# Patient Record
Sex: MALE | Race: WHITE | Employment: OTHER | URBAN - METROPOLITAN AREA
[De-identification: names, ages, dates, MRNs, and addresses within clinical notes are randomized per-mention and may not be internally consistent; named-entity substitution may affect disease eponyms.]

---

## 2017-02-13 DIAGNOSIS — E03.4 HYPOTHYROIDISM DUE TO ACQUIRED ATROPHY OF THYROID: Primary | ICD-10-CM

## 2017-02-13 NOTE — TELEPHONE ENCOUNTER
Patient in [place] currently  Historical in chart    Pending Prescriptions:                       Disp   Refills    levothyroxine (SYNTHROID/LEVOTHROID) 75 M*90 tab*2            Sig: Take 1 tablet (75 mcg) by mouth daily         Last Written Prescription Date: historical  Last Quantity: -, # refills: -  Last Office Visit with G, [facility] or [facility] prescribing provider: 10-4-16        TSH   Date Value Ref Range Status   10/04/2016 1.50 0.40 - 4.00 mU/L Final     RT Alessio (R)

## 2017-02-14 RX ORDER — LEVOTHYROXINE SODIUM 75 UG/1
75 TABLET ORAL DAILY
Qty: 90 TABLET | Refills: 3 | Status: SHIPPED | OUTPATIENT
Start: 2017-02-14 | End: 2018-02-07

## 2017-03-08 DIAGNOSIS — E78.5 HYPERLIPIDEMIA, UNSPECIFIED HYPERLIPIDEMIA TYPE: Primary | ICD-10-CM

## 2017-03-08 NOTE — TELEPHONE ENCOUNTER
Pending Prescriptions:                       Disp   Refills    simvastatin (ZOCOR) 10 MG tablet          30 tab*             Sig: Take 1 tablet (10 mg) by mouth At Bedtime          Last Written Prescription Date:    Last Fill Quantity: ,   # refills:   Last Office Visit with [facility], [facility] or [facility] prescribing provider: 10/04/16  Future Office visit:       Routing refill request to provider for review/approval because:  Medication is reported/historical

## 2017-03-10 RX ORDER — SIMVASTATIN 10 MG
10 TABLET ORAL AT BEDTIME
Qty: 90 TABLET | Refills: 3 | Status: SHIPPED | OUTPATIENT
Start: 2017-03-10 | End: 2018-03-08

## 2017-03-10 NOTE — TELEPHONE ENCOUNTER
Routing refill request to provider for review/approval because:  Medication is reported/historical  Nazia Melendez RN

## 2017-05-24 ENCOUNTER — OFFICE VISIT (OUTPATIENT)
Dept: FAMILY MEDICINE | Facility: CLINIC | Age: 77
End: 2017-05-24
Payer: COMMERCIAL

## 2017-05-24 VITALS
SYSTOLIC BLOOD PRESSURE: 112 MMHG | BODY MASS INDEX: 26.48 KG/M2 | HEART RATE: 62 BPM | DIASTOLIC BLOOD PRESSURE: 66 MMHG | TEMPERATURE: 97.6 F | WEIGHT: 168.7 LBS | OXYGEN SATURATION: 99 % | HEIGHT: 67 IN

## 2017-05-24 DIAGNOSIS — E03.4 HYPOTHYROIDISM DUE TO ACQUIRED ATROPHY OF THYROID: ICD-10-CM

## 2017-05-24 DIAGNOSIS — C44.91 SKIN CANCER, BASAL CELL: Primary | ICD-10-CM

## 2017-05-24 DIAGNOSIS — I10 BENIGN ESSENTIAL HYPERTENSION: ICD-10-CM

## 2017-05-24 DIAGNOSIS — E78.5 HYPERLIPIDEMIA LDL GOAL <130: ICD-10-CM

## 2017-05-24 DIAGNOSIS — N40.0 PROSTATISM: ICD-10-CM

## 2017-05-24 DIAGNOSIS — L57.0 AK (ACTINIC KERATOSIS): ICD-10-CM

## 2017-05-24 PROCEDURE — 99213 OFFICE O/P EST LOW 20 MIN: CPT | Mod: 25 | Performed by: INTERNAL MEDICINE

## 2017-05-24 PROCEDURE — 17003 DESTRUCT PREMALG LES 2-14: CPT | Performed by: INTERNAL MEDICINE

## 2017-05-24 PROCEDURE — 17000 DESTRUCT PREMALG LESION: CPT | Performed by: INTERNAL MEDICINE

## 2017-05-24 NOTE — PROGRESS NOTES
SUBJECTIVE:                                                    Nilo Nelson is a 76 year old male who presents to clinic today for the following health issues:    Mr. Nelson has a history of basal cell cancer and presents to the clinic for follow up of lesion of the dome of his head. He reports there is a new lesion forming by the previously excised lesion on his dome as well as a second new lesion just inferior to the lesion removed form his right ear. He recognizes the lesion on his right ear as changing in texture.    Problem list and histories reviewed & adjusted, as indicated.  Additional history: as documented    Current Outpatient Prescriptions   Medication Sig Dispense Refill     simvastatin (ZOCOR) 10 MG tablet Take 1 tablet (10 mg) by mouth At Bedtime 90 tablet 3     levothyroxine (SYNTHROID/LEVOTHROID) 75 MCG tablet Take 1 tablet (75 mcg) by mouth daily 90 tablet 3     tamsulosin (FLOMAX) 0.4 MG 24 hr capsule Take 0.4 mg by mouth daily.       aspirin 81 MG tablet Take 1 tablet by mouth daily.       Psyllium (METAMUCIL PO) Take 2 tsp by mouth daily.       Multiple Vitamins-Minerals (CENTRUM SILVER) per tablet Take 1 tablet by mouth daily.       GLUCOSAMINE CHONDROITIN COMPLX PO Take 1 tablet by mouth daily.       Loratadine (CLARITIN PO) Take  by mouth as needed.       No Known Allergies    Reviewed and updated as needed this visit by clinical staff       Reviewed and updated as needed this visit by Provider         ROS:  MS: He reports significant improvement of right shoulder pain following a series of visits to a physical therapist. He continues to follow at-home exercises.    Constitutional, HEENT, cardiovascular, pulmonary, gi and gu systems are negative, except as otherwise noted.    This document serves as a record of the services and decisions personally performed and made by Perez Carr MD. It was created on his/her behalf by Bonnie Lauren, a trained medical scribe. The creation of  "this document is based the provider's statements to the medical scribe.  Scribe Bonnie Lauren 2:08 PM, May 24, 2017     OBJECTIVE:                                                    /66 (BP Location: Left arm, Patient Position: Chair, Cuff Size: Adult Regular)  Pulse 62  Temp 97.6  F (36.4  C) (Oral)  Ht 1.702 m (5' 7\")  Wt 76.5 kg (168 lb 11.2 oz)  SpO2 99%  BMI 26.42 kg/m2  Body mass index is 26.42 kg/(m^2).  Neck was supple without adenopathy or thyromegaly his carotids were normal without bruits  Skin there is an actinic keratosis on his right ear and 4 areas of actinic keratoses on the posterior aspect of the dome of his scalp not quite in the configuration of a part.   Procedure:  The five noted areas of actinic keratoses were treated with liquid nitrogen Times two for each lesion.  ASSESSMENT/PLAN:                                                    1. Skin cancer, basal cell  The 5 actinic keratoses on the posterior dome and right ear were treated cryogenically in a freeze-thaw-freeze manner. Discussed sun protection.I'm follow-up skincare was reviewed and precautions for signs or symptoms of infection.     2. Benign essential hypertension  Under great control with today's reading     3. Hyperlipidemia LDL goal <130    4. Prostatism  Symptoms are adequately controlled with his current routine.  5. Hypothyroidism due to acquired atrophy of thyroid  No reported symptoms which would indicate hormone imbalance.     Expect areas to resolve over the next month, if scaliness persists past one month, contact me. Otherwise follow up with routine annual physical.    Perez Carr MD  [facility]    The information in this document, created by the medical scribe for me, accurately reflects the services I personally performed and the decisions made by me. I have reviewed and approved this document for accuracy prior to leaving the patient care area.  Perez Carr MD  2:09 PM, 05/24/17   "

## 2017-05-24 NOTE — NURSING NOTE
"Chief Complaint   Patient presents with     Lesion       Initial /66 (BP Location: Left arm, Patient Position: Chair, Cuff Size: Adult Regular)  Pulse 62  Temp 97.6  F (36.4  C) (Oral)  Ht 5' 7\" (1.702 m)  Wt 168 lb 11.2 oz (76.5 kg)  SpO2 99%  BMI 26.42 kg/m2 Estimated body mass index is 26.42 kg/(m^2) as calculated from the following:    Height as of this encounter: 5' 7\" (1.702 m).    Weight as of this encounter: 168 lb 11.2 oz (76.5 kg).  Medication Reconciliation: complete.  Susan Osei CMA    "

## 2017-05-24 NOTE — MR AVS SNAPSHOT
"              After Visit Summary   5/24/2017    Nilo Nelson    MRN: [id number]           Patient Information     Date Of Birth          1940        Visit Information        Provider Department      5/24/2017 2:00 PM Perez Carr MD [facility]        Today's Diagnoses     Skin cancer, basal cell    -  1    Benign essential hypertension        Hyperlipidemia LDL goal <130        Prostatism        Hypothyroidism due to acquired atrophy of thyroid        AK (actinic keratosis)           Follow-ups after your visit        Your next 10 appointments already scheduled     Oct 05, 2017  9:30 AM CDT   PHYSICAL with Perez Carr MD   [facility] ([facility])    [address] [facility] [address] [phone]              Who to contact     If you have questions or need follow up information about today's clinic visit or your schedule please contact [facility] directly at [phone].  Normal or non-critical lab and imaging results will be communicated to you by MyChart, letter or phone within 4 business days after the clinic has received the results. If you do not hear from us within 7 days, please contact the clinic through Kickit Withhart or phone. If you have a critical or abnormal lab result, we will notify you by phone as soon as possible.  Submit refill requests through LeanApps or call your pharmacy and they will forward the refill request to us. Please allow 3 business days for your refill to be completed.          Additional Information About Your Visit        Kickit WithharMark One Information     LeanApps lets you send messages to your doctor, view your test results, renew your prescriptions, schedule appointments and more. To sign up, go to [email or web address] . Click on \"Log in\" on the left side of the screen, which will take you to the Welcome page. Then click on \"Sign up Now\" on the right side of the page.     You will be asked to enter the access code " "listed below, as well as some personal information. Please follow the directions to create your username and password.     Your access code is: [email or web address]  Expires: 2017  5:42 AM     Your access code will  in 90 days. If you need help or a new code, please call your [facility] or [phone].        Care EveryWhere ID     This is your Care EveryWhere ID. This could be used by other organizations to access your [place] medical records  [id number]        Your Vitals Were     Pulse Temperature Height Pulse Oximetry BMI (Body Mass Index)       62 97.6  F (36.4  C) (Oral) 5' 7\" (1.702 m) 99% 26.42 kg/m2        Blood Pressure from Last 3 Encounters:   17 112/66   10/04/16 116/66   10/18/12 100/70    Weight from Last 3 Encounters:   17 168 lb 11.2 oz (76.5 kg)   10/04/16 160 lb (72.6 kg)   10/18/12 155 lb (70.3 kg)              We Performed the Following     DESTRUCT PREMALIGNANT LESION, 2-14     DESTRUCT PREMALIGNANT LESION, 2-14     DESTRUCT PREMALIGNANT LESION, 2-14     DESTRUCT PREMALIGNANT LESION, 2-14     DESTRUCT PREMALIGNANT LESION, FIRST        Primary Care Provider Office Phone # Fax #    Perez Carr [phone]       [facility] [address] [facility] [address]  [facility] [address]        Thank you!     Thank you for choosing [facility]  for your care. Our goal is always to provide you with excellent care. Hearing back from our patients is one way we can continue to improve our services. Please take a few minutes to complete the written survey that you may receive in the mail after your visit with us. Thank you!             Your Updated Medication List - Protect others around you: Learn how to safely use, store and throw away your medicines at www.disposemymeds.org.          This list is accurate as of: 17 11:59 PM.  Always use your most recent med list.                   Brand Name Dispense Instructions for use    aspirin 81 MG tablet      " Take 1 tablet by mouth daily.       CENTRUM SILVER per tablet      Take 1 tablet by mouth daily.       CLARITIN PO      Take  by mouth as needed.       GLUCOSAMINE CHONDROITIN COMPLX PO      Take 1 tablet by mouth daily.       levothyroxine 75 MCG tablet    SYNTHROID/LEVOTHROID    90 tablet    Take 1 tablet (75 mcg) by mouth daily       METAMUCIL PO      Take 2 tsp by mouth daily.       simvastatin 10 MG tablet    ZOCOR    90 tablet    Take 1 tablet (10 mg) by mouth At Bedtime

## 2017-05-30 ENCOUNTER — TELEPHONE (OUTPATIENT)
Dept: NURSING | Facility: CLINIC | Age: 77
End: 2017-05-30

## 2017-05-30 DIAGNOSIS — N40.0 PROSTATISM: Primary | ICD-10-CM

## 2017-05-30 RX ORDER — TAMSULOSIN HYDROCHLORIDE 0.4 MG/1
0.4 CAPSULE ORAL
Qty: 180 CAPSULE | Refills: 3 | Status: SHIPPED | OUTPATIENT
Start: 2017-05-30 | End: 2018-06-04

## 2017-10-05 ENCOUNTER — OFFICE VISIT (OUTPATIENT)
Dept: FAMILY MEDICINE | Facility: CLINIC | Age: 77
End: 2017-10-05
Payer: COMMERCIAL

## 2017-10-05 VITALS
DIASTOLIC BLOOD PRESSURE: 68 MMHG | BODY MASS INDEX: 25.87 KG/M2 | HEART RATE: 95 BPM | SYSTOLIC BLOOD PRESSURE: 108 MMHG | OXYGEN SATURATION: 97 % | WEIGHT: 164.8 LBS | TEMPERATURE: 98 F | HEIGHT: 67 IN

## 2017-10-05 DIAGNOSIS — E55.9 VITAMIN D DEFICIENCY: ICD-10-CM

## 2017-10-05 DIAGNOSIS — Z00.00 ROUTINE GENERAL MEDICAL EXAMINATION AT A HEALTH CARE FACILITY: ICD-10-CM

## 2017-10-05 DIAGNOSIS — E03.4 HYPOTHYROIDISM DUE TO ACQUIRED ATROPHY OF THYROID: ICD-10-CM

## 2017-10-05 DIAGNOSIS — E78.5 HYPERLIPIDEMIA LDL GOAL <130: ICD-10-CM

## 2017-10-05 DIAGNOSIS — N40.0 PROSTATISM: ICD-10-CM

## 2017-10-05 DIAGNOSIS — Z71.89 ADVANCED DIRECTIVES, COUNSELING/DISCUSSION: ICD-10-CM

## 2017-10-05 DIAGNOSIS — I10 BENIGN ESSENTIAL HYPERTENSION: Primary | ICD-10-CM

## 2017-10-05 LAB
ALBUMIN UR-MCNC: NEGATIVE MG/DL
APPEARANCE UR: CLEAR
BACTERIA #/AREA URNS HPF: ABNORMAL /HPF
BILIRUB UR QL STRIP: NEGATIVE
COLOR UR AUTO: YELLOW
ERYTHROCYTE [DISTWIDTH] IN BLOOD BY AUTOMATED COUNT: 12.9 % (ref 10–15)
GLUCOSE UR STRIP-MCNC: NEGATIVE MG/DL
HCT VFR BLD AUTO: 46.5 % (ref 40–53)
HGB BLD-MCNC: 16 G/DL (ref 13.3–17.7)
HGB UR QL STRIP: ABNORMAL
KETONES UR STRIP-MCNC: NEGATIVE MG/DL
LEUKOCYTE ESTERASE UR QL STRIP: NEGATIVE
MCH RBC QN AUTO: 33.2 PG (ref 26.5–33)
MCHC RBC AUTO-ENTMCNC: 34.4 G/DL (ref 31.5–36.5)
MCV RBC AUTO: 97 FL (ref 78–100)
NITRATE UR QL: NEGATIVE
PH UR STRIP: 7 PH (ref 5–7)
PLATELET # BLD AUTO: 268 10E9/L (ref 150–450)
RBC # BLD AUTO: 4.82 10E12/L (ref 4.4–5.9)
RBC #/AREA URNS AUTO: ABNORMAL /HPF
SOURCE: ABNORMAL
SP GR UR STRIP: 1.01 (ref 1–1.03)
UROBILINOGEN UR STRIP-ACNC: 0.2 EU/DL (ref 0.2–1)
WBC # BLD AUTO: 5.2 10E9/L (ref 4–11)
WBC #/AREA URNS AUTO: ABNORMAL /HPF

## 2017-10-05 PROCEDURE — 36415 COLL VENOUS BLD VENIPUNCTURE: CPT | Performed by: INTERNAL MEDICINE

## 2017-10-05 PROCEDURE — 90662 IIV NO PRSV INCREASED AG IM: CPT | Performed by: INTERNAL MEDICINE

## 2017-10-05 PROCEDURE — 82306 VITAMIN D 25 HYDROXY: CPT | Performed by: INTERNAL MEDICINE

## 2017-10-05 PROCEDURE — 85027 COMPLETE CBC AUTOMATED: CPT | Performed by: INTERNAL MEDICINE

## 2017-10-05 PROCEDURE — 80053 COMPREHEN METABOLIC PANEL: CPT | Performed by: INTERNAL MEDICINE

## 2017-10-05 PROCEDURE — 80061 LIPID PANEL: CPT | Performed by: INTERNAL MEDICINE

## 2017-10-05 PROCEDURE — 81001 URINALYSIS AUTO W/SCOPE: CPT | Performed by: INTERNAL MEDICINE

## 2017-10-05 PROCEDURE — 84443 ASSAY THYROID STIM HORMONE: CPT | Performed by: INTERNAL MEDICINE

## 2017-10-05 PROCEDURE — 99215 OFFICE O/P EST HI 40 MIN: CPT | Mod: 25 | Performed by: INTERNAL MEDICINE

## 2017-10-05 PROCEDURE — G0008 ADMIN INFLUENZA VIRUS VAC: HCPCS | Performed by: INTERNAL MEDICINE

## 2017-10-05 NOTE — MR AVS SNAPSHOT
After Visit Summary   10/5/2017    Nilo Nelson    MRN: [id number]           Patient Information     Date Of Birth          1940        Visit Information        Provider Department      10/5/2017 9:30 AM Perez Carr MD [facility]        Today's Diagnoses     Benign essential hypertension    -  1    Hyperlipidemia LDL goal <130        Prostatism        Hypothyroidism due to acquired atrophy of thyroid        Routine general medical examination at a health care facility        Vitamin D deficiency        Advanced directives, counseling/discussion          Care Instructions      Preventive Health Recommendations:       Male Ages 65 and over    Yearly exam:             See your health care provider every year in order to  o   Review health changes.   o   Discuss preventive care.    o   Review your medicines if your doctor has prescribed any.    Talk with your health care provider about whether you should have a test to screen for prostate cancer (PSA).    Every 3 years, have a diabetes test (fasting glucose). If you are at risk for diabetes, you should have this test more often.    Every 5 years, have a cholesterol test. Have this test more often if you are at risk for high cholesterol or heart disease.     Every 10 years, have a colonoscopy. Or, have a yearly FIT test (stool test). These exams will check for colon cancer.    Talk to with your health care provider about screening for Abdominal Aortic Aneurysm if you have a family history of AAA or have a history of smoking.  Shots:     Get a flu shot each year.     Get a tetanus shot every 10 years.     Talk to your doctor about your pneumonia vaccines. There are now two you should receive - Pneumovax (PPSV 23) and Prevnar (PCV 13).    Talk to your doctor about a shingles vaccine.     Talk to your doctor about the hepatitis B vaccine.  Nutrition:     Eat at least 5 servings of fruits and vegetables each day.     Eat whole-grain  bread, whole-wheat pasta and brown rice instead of white grains and rice.     Talk to your doctor about Calcium and Vitamin D.   Lifestyle    Exercise for at least 150 minutes a week (30 minutes a day, 5 days a week). This will help you control your weight and prevent disease.     Limit alcohol to one drink per day.     No smoking.     Wear sunscreen to prevent skin cancer.     See your dentist every six months for an exam and cleaning.     See your eye doctor every 1 to 2 years to screen for conditions such as glaucoma, macular degeneration and cataracts.          Follow-ups after your visit        Your next 10 appointments already scheduled     Oct 10, 2017  2:30 PM CDT   [facility] Visit with Perez Carr MD   [facility]    [address] [address] [phone]           Bring a current list of meds and any records pertaining to this visit. For Physicals, please bring immunization records and any forms needing to be filled out. Please arrive 10 minutes early to complete paperwork.              Who to contact     If you have questions or need follow up information about today's clinic visit or your schedule please contact [facility] directly at [phone].  Normal or non-critical lab and imaging results will be communicated to you by Novadiolhart, letter or phone within 4 business days after the clinic has received the results. If you do not hear from us within 7 days, please contact the clinic through Novadiolhart or phone. If you have a critical or abnormal lab result, we will notify you by phone as soon as possible.  Submit refill requests through Nomadesk or call your pharmacy and they will forward the refill request to us. Please allow 3 business days for your refill to be completed.          Additional Information About Your Visit        Nomadesk Information     Nomadesk lets you send messages to your doctor, view your test results, renew your  "prescriptions, schedule appointments and more. To sign up, go to [email or web address] . Click on \"Log in\" on the left side of the screen, which will take you to the Welcome page. Then click on \"Sign up Now\" on the right side of the page.     You will be asked to enter the access code listed below, as well as some personal information. Please follow the directions to create your username and password.     Your access code is: [email or web address]  Expires: 2018  4:37 AM     Your access code will  in 90 days. If you need help or a new code, please call your [place] clinic or [phone].        Care EveryWhere ID     This is your Care EveryWhere ID. This could be used by other organizations to access your [place] medical records  [id number]        Your Vitals Were     Pulse Temperature Height Pulse Oximetry BMI (Body Mass Index)       95 98  F (36.7  C) (Oral) 5' 7\" (1.702 m) 97% 25.81 kg/m2        Blood Pressure from Last 3 Encounters:   10/05/17 108/68   17 112/66   10/04/16 116/66    Weight from Last 3 Encounters:   10/05/17 164 lb 12.8 oz (74.8 kg)   17 168 lb 11.2 oz (76.5 kg)   10/04/16 160 lb (72.6 kg)              We Performed the Following     ADMIN 1st VACCINE     CBC with platelets     Comprehensive metabolic panel     FLU VACCINE, INCREASED ANTIGEN, PRESV FREE     Lipid panel reflex to direct LDL     TSH with free T4 reflex     UA reflex to Microscopic and Culture     Urine Microscopic     Vitamin D Deficiency        Primary Care Provider Office Phone # Fax #    Perez Carr [phone] [address]  [facility] [address]        Equal Access to Services     HILDA GOLDSTEIN : Murali Hood, waenoc iglesias, qawalterta keri torrez, loni brooks. So [facility] [phone].    ATENCIÓN: Si habla español, tiene a martinez disposición servicios gratuitos de asistencia lingüística. Cesar al [phone].    We comply with " applicable federal civil rights laws and [place] laws. We do not discriminate on the basis of race, color, national origin, age, disability, sex, sexual orientation, or gender identity.            Thank you!     Thank you for choosing [facility]  for your care. Our goal is always to provide you with excellent care. Hearing back from our patients is one way we can continue to improve our services. Please take a few minutes to complete the written survey that you may receive in the mail after your visit with us. Thank you!             Your Updated Medication List - Protect others around you: Learn how to safely use, store and throw away your medicines at www.disposemymeds.org.          This list is accurate as of: 10/5/17 11:59 PM.  Always use your most recent med list.                   Brand Name Dispense Instructions for use Diagnosis    aspirin 81 MG tablet      Take 1 tablet by mouth daily.        CENTRUM SILVER per tablet      Take 1 tablet by mouth daily.        CLARITIN PO      Take  by mouth as needed.        GLUCOSAMINE CHONDROITIN COMPLX PO      Take 1 tablet by mouth daily.        levothyroxine 75 MCG tablet    SYNTHROID/LEVOTHROID    90 tablet    Take 1 tablet (75 mcg) by mouth daily    Hypothyroidism due to acquired atrophy of thyroid       METAMUCIL PO      Take 2 tsp by mouth daily.        simvastatin 10 MG tablet    ZOCOR    90 tablet    Take 1 tablet (10 mg) by mouth At Bedtime    Hyperlipidemia, unspecified hyperlipidemia type       tamsulosin 0.4 MG capsule    FLOMAX    180 capsule    Take 1 capsule (0.4 mg) by mouth 2 times daily    Prostatism       VITAMIN D (CHOLECALCIFEROL) PO      Take 600 tablets by mouth daily

## 2017-10-05 NOTE — PROGRESS NOTES
SUBJECTIVE:   Nilo Nelson is a 77 year old male who presents for Preventive Visit.    Are you in the first 12 months of your Medicare Part B coverage?  No    Healthy Habits:    Do you get at least three servings of calcium containing foods daily (dairy, green leafy vegetables, etc.)? yes    Amount of exercise or daily activities, outside of work: 8 hour (s) always up doing things per pt     Problems taking medications regularly No    Medication side effects: No    Have you had an eye exam in the past two years? yes    Do you see a dentist twice per year? Once a year     Do you have sleep apnea, excessive snoring or daytime drowsiness?no    COGNITIVE SCREEN  1) Repeat 3 items (Banana, Sunrise, Chair)    2) Clock draw: NORMAL  3) 3 item recall: Recalls 3 objects  Results: 3 items recalled: COGNITIVE IMPAIRMENT LESS LIKELY    Mini-CogTM Copyright S Kav[employer]. Licensed by the author for use in [facility] [employer]; reprinted with permission ([email or web address]). All rights reserved.      Patient with history of prostatism presents to the clinic for a preventive health visit. He complains of occasional weakness in his lower back that resolves with no action from him. He states that his symptoms present every two or three days randomly and they are aggravated by certain movements. Discomfort does not radiate into his leg, is not consistent and it doesn't happen frequently. Patient is not doing any back strengthening exercises. He has a history of right shoulder pain and he reports that pain and flexibility has improved with shoulder exercises.  Patient denies headaches, sinus issues, dyspnea, angina, palpitations, heartburn, bowel changes, and hematochezia. His vision is blurry and shows signs of cataracts which is being followed closely. He does not do any regular exercise. Patient experiences nocturia 1x.    Reviewed and updated as needed this visit by clinical staff  Tobacco  Allergies  Meds  Med Hx  Surg Hx  Fam  Hx  Soc Hx      Reviewed and updated as needed this visit by Provider      Social History   Substance Use Topics     Smoking status: Former Smoker     Smokeless tobacco: Never Used      Comment: per encounter 3/9/07     Alcohol use 0.0 oz/week     0 Standard drinks or equivalent per week      Comment: 1 day       The patient does not drink >3 drinks per day nor >7 drinks per week.    Today's PHQ-2 Score:   PHQ-2 ( 1999 Pf[employer]) 10/4/2016   Q1: Little interest or pleasure in doing things 0   Q2: Feeling down, depressed or hopeless 0   PHQ-2 Score 0     Do you feel safe in your environment - Yes    Do you have a Health Care Directive?: Yes: Advance Directive has been received and scanned.    Current providers sharing in care for this patient include: Patient Care Team:  Perez Carr MD as PCP - General (Internal Medicine)      Hearing impairment: No    Ability to successfully perform activities of daily living: Yes, no assistance needed     Fall risk:       Home safety:  none identified    The following health maintenance items are reviewed in Epic and correct as of today:Health Maintenance   Topic Date Due     ADVANCE DIRECTIVE PLANNING Q5 YRS  08/04/1994     INFLUENZA VACCINE (SYSTEM ASSIGNED)  09/01/2017     FALL RISK ASSESSMENT  10/04/2017     LIPID SCREEN Q5 YR MALE (SYSTEM ASSIGNED)  10/04/2021     TETANUS IMMUNIZATION (SYSTEM ASSIGNED)  09/14/2022     PNEUMOCOCCAL  Completed     Current Outpatient Prescriptions   Medication Sig Dispense Refill     VITAMIN D, CHOLECALCIFEROL, PO Take 600 tablets by mouth daily       tamsulosin (FLOMAX) 0.4 MG capsule Take 1 capsule (0.4 mg) by mouth 2 times daily 180 capsule 3     simvastatin (ZOCOR) 10 MG tablet Take 1 tablet (10 mg) by mouth At Bedtime 90 tablet 3     levothyroxine (SYNTHROID/LEVOTHROID) 75 MCG tablet Take 1 tablet (75 mcg) by mouth daily 90 tablet 3     aspirin 81 MG tablet Take 1 tablet by mouth daily.       Psyllium (METAMUCIL PO) Take 2 tsp by mouth  "daily.       Multiple Vitamins-Minerals (CENTRUM SILVER) per tablet Take 1 tablet by mouth daily.       GLUCOSAMINE CHONDROITIN COMPLX PO Take 1 tablet by mouth daily.       Loratadine (CLARITIN PO) Take  by mouth as needed.       No Known Allergies    ROS:  C: NEGATIVE for fever, chills, change in weight  I: NEGATIVE for worrisome rashes, moles or lesions  E: NEGATIVE for vision changes or irritation  E/M: NEGATIVE for ear, mouth and throat problems  R: NEGATIVE for significant cough or SOB  B: NEGATIVE for masses, tenderness or discharge  CV: NEGATIVE for chest pain, palpitations or peripheral edema  GI: NEGATIVE for nausea, abdominal pain, heartburn, or change in bowel habits  : NEGATIVE for frequency, dysuria, or hematuria  M: NEGATIVE for significant arthralgias or myalgia  N: NEGATIVE for weakness, dizziness or paresthesias  E: NEGATIVE for temperature intolerance, skin/hair changes  H: NEGATIVE for bleeding problems  P: NEGATIVE for changes in mood or affect    This document serves as a record of the services and decisions personally performed and made by Perez Carr MD. It was created on his/her behalf by French Dial, a trained medical scribe. The creation of this document is based the provider's statements to the medical scribe.  Scribkatja Dial 9:52 AM, October 5, 2017    OBJECTIVE:   /68 (BP Location: Left arm, Patient Position: Chair, Cuff Size: Adult Regular)  Pulse 95  Temp 98  F (36.7  C) (Oral)  Ht 1.702 m (5' 7\")  Wt 74.8 kg (164 lb 12.8 oz)  SpO2 97%  BMI 25.81 kg/m2 Estimated body mass index is 26.42 kg/(m^2) as calculated from the following:    Height as of 5/24/17: 5' 7\" (1.702 m).    Weight as of 5/24/17: 168 lb 11.2 oz (76.5 kg).  EXAM:   GENERAL: healthy, alert and no distress  EYES: Eyes grossly normal to inspection, PERRL and conjunctivae and sclerae normal  HENT: ear canals and TM's normal, nose and mouth without ulcers or lesions  NECK: no adenopathy, no " asymmetry, masses, or scars and thyroid normal to palpation  RESP: lungs clear to auscultation - no rales, rhonchi or wheezes  CV: regular rate and rhythm, normal S1 S2, no S3 or S4, no murmur, click or rub, no peripheral edema and peripheral pulses strong  ABDOMEN: soft, nontender, no hepatosplenomegaly, no masses and bowel sounds normal   (male): normal male genitalia without lesions or urethral discharge, no hernia, his testes were slightly atrophic, anus was normal, prostate gland was 3+ and smooth  MS: no gross musculoskeletal defects noted, no edema  SKIN: no suspicious lesions or rashes, he has an actinic keratoses of the right superior back  He has two actinic keratoses on his dome  NEURO: Normal strength and tone, mentation intact and speech normal, straight leg raise was negative, deep tendon reflexes were normal  PSYCH: mentation appears normal, affect normal/bright    ASSESSMENT / PLAN:   1. Benign essential hypertension  - UA reflex to Microscopic and Culture  - Comprehensive metabolic panel    2. Hyperlipidemia LDL goal <130  - Lipid panel reflex to direct LDL    3. Prostatism    4. Hypothyroidism due to acquired atrophy of thyroid  - TSH with free T4 reflex    5. Routine general medical examination at a health care facility  - CBC with platelets  - ADMIN 1st VACCINE    6. Vitamin D deficiency  - Vitamin D Deficiency  - FLU VACCINE, INCREASED ANTIGEN, PRESV FREE    7. Advanced directives, counseling/discussion    -Patient will be returning to follow up on his reports and his wife requested we reevaluate his dementia which will be done at that time.    End of Life Planning:  Patient currently has an advanced directive: Yes.  Practitioner is supportive of decision.    COUNSELING:  Reviewed preventive health counseling, as reflected in patient instructions       Regular exercise       Healthy diet/nutrition       Vision screening       Hearing screening       Dental care    Estimated body mass index is  "26.42 kg/(m^2) as calculated from the following:    Height as of 5/24/17: 5' 7\" (1.702 m).    Weight as of 5/24/17: 168 lb 11.2 oz (76.5 kg).     reports that he has quit smoking. He has never used smokeless tobacco.    Appropriate preventive services were discussed with this patient, including applicable screening as appropriate for cardiovascular disease, diabetes, osteopenia/osteoporosis, and glaucoma.  As appropriate for age/gender, discussed screening for colorectal cancer, prostate cancer, breast cancer, and cervical cancer. Checklist reviewing preventive services available has been given to the patient.    Reviewed patients plan of care and provided an AVS. The Basic Care Plan (routine screening as documented in Health Maintenance) for Nilo meets the Care Plan requirement. This Care Plan has been established and reviewed with the Patient.    Counseling Resources:  ATP IV Guidelines  Pooled Cohorts Equation Calculator  Breast Cancer Risk Calculator  FRAX Risk Assessment  ICSI Preventive Guidelines  Dietary Guidelines for Americans, 2010  [employer]'s MyPlate  ASA Prophylaxis  Lung CA Screening    Perez Carr MD  [facility]    The information in this document, created by the medical scribe for me, accurately reflects the services I personally performed and the decisions made by me. I have reviewed and approved this document for accuracy prior to leaving the patient care area.  Perez Carr MD  10:27 AM, 10/05/17    Injectable Influenza Immunization Documentation    1.  Is the person to be vaccinated sick today?   No    2. Does the person to be vaccinated have an allergy to a component   of the vaccine?   No    3. Has the person to be vaccinated ever had a serious reaction   to influenza vaccine in the past?   No    4. Has the person to be vaccinated ever had Guillain-Barré syndrome?   No    Form completed by   Yakelin Shell MA            "

## 2017-10-06 LAB
ALBUMIN SERPL-MCNC: 4 G/DL (ref 3.4–5)
ALP SERPL-CCNC: 72 U/L (ref 40–150)
ALT SERPL W P-5'-P-CCNC: 27 U/L (ref 0–70)
ANION GAP SERPL CALCULATED.3IONS-SCNC: 8 MMOL/L (ref 3–14)
AST SERPL W P-5'-P-CCNC: 23 U/L (ref 0–45)
BILIRUB SERPL-MCNC: 0.8 MG/DL (ref 0.2–1.3)
BUN SERPL-MCNC: 14 MG/DL (ref 7–30)
CALCIUM SERPL-MCNC: 9.2 MG/DL (ref 8.5–10.1)
CHLORIDE SERPL-SCNC: 105 MMOL/L (ref 94–109)
CHOLEST SERPL-MCNC: 192 MG/DL
CO2 SERPL-SCNC: 25 MMOL/L (ref 20–32)
CREAT SERPL-MCNC: 0.92 MG/DL (ref 0.66–1.25)
DEPRECATED CALCIDIOL+CALCIFEROL SERPL-MC: 48 UG/L (ref 20–75)
GFR SERPL CREATININE-BSD FRML MDRD: 79 ML/MIN/1.7M2
GLUCOSE SERPL-MCNC: 90 MG/DL (ref 70–99)
HDLC SERPL-MCNC: 70 MG/DL
LDLC SERPL CALC-MCNC: 96 MG/DL
NONHDLC SERPL-MCNC: 122 MG/DL
POTASSIUM SERPL-SCNC: 4.7 MMOL/L (ref 3.4–5.3)
PROT SERPL-MCNC: 7.4 G/DL (ref 6.8–8.8)
SODIUM SERPL-SCNC: 138 MMOL/L (ref 133–144)
TRIGL SERPL-MCNC: 128 MG/DL
TSH SERPL DL<=0.005 MIU/L-ACNC: 1.47 MU/L (ref 0.4–4)

## 2017-10-10 ENCOUNTER — OFFICE VISIT (OUTPATIENT)
Dept: FAMILY MEDICINE | Facility: CLINIC | Age: 77
End: 2017-10-10
Payer: COMMERCIAL

## 2017-10-10 VITALS
TEMPERATURE: 96.9 F | OXYGEN SATURATION: 98 % | HEIGHT: 67 IN | DIASTOLIC BLOOD PRESSURE: 73 MMHG | HEART RATE: 65 BPM | SYSTOLIC BLOOD PRESSURE: 125 MMHG | BODY MASS INDEX: 26.06 KG/M2 | WEIGHT: 166 LBS

## 2017-10-10 DIAGNOSIS — E78.5 HYPERLIPIDEMIA LDL GOAL <130: ICD-10-CM

## 2017-10-10 DIAGNOSIS — E03.4 HYPOTHYROIDISM DUE TO ACQUIRED ATROPHY OF THYROID: ICD-10-CM

## 2017-10-10 DIAGNOSIS — L57.0 AK (ACTINIC KERATOSIS): ICD-10-CM

## 2017-10-10 DIAGNOSIS — I10 BENIGN ESSENTIAL HYPERTENSION: Primary | ICD-10-CM

## 2017-10-10 DIAGNOSIS — N40.0 PROSTATISM: ICD-10-CM

## 2017-10-10 PROCEDURE — 17000 DESTRUCT PREMALG LESION: CPT | Performed by: INTERNAL MEDICINE

## 2017-10-10 PROCEDURE — 17003 DESTRUCT PREMALG LES 2-14: CPT | Performed by: INTERNAL MEDICINE

## 2017-10-10 PROCEDURE — 99214 OFFICE O/P EST MOD 30 MIN: CPT | Mod: 25 | Performed by: INTERNAL MEDICINE

## 2017-10-10 NOTE — NURSING NOTE
"Chief Complaint   Patient presents with     Derm Problem       Initial /73 (BP Location: Right arm, Patient Position: Sitting, Cuff Size: Adult Small)  Pulse 65  Temp 96.9  F (36.1  C) (Oral)  Ht 5' 7\" (1.702 m)  Wt 166 lb (75.3 kg)  SpO2 98%  BMI 26 kg/m2 Estimated body mass index is 26 kg/(m^2) as calculated from the following:    Height as of this encounter: 5' 7\" (1.702 m).    Weight as of this encounter: 166 lb (75.3 kg).  Medication Reconciliation: complete   Kathie Clarendon- CMA      "

## 2017-10-10 NOTE — MR AVS SNAPSHOT
"              After Visit Summary   10/10/2017    Nilo Nelson    MRN: [id number]           Patient Information     Date Of Birth          1940        Visit Information        Provider Department      10/10/2017 2:30 PM Perez Carr MD [facility]        Today's Diagnoses     Benign essential hypertension    -  1    Hyperlipidemia LDL goal <130        Prostatism        Hypothyroidism due to acquired atrophy of thyroid        AK (actinic keratosis)           Follow-ups after your visit        Who to contact     If you have questions or need follow up information about today's clinic visit or your schedule please contact [facility] directly at [phone].  Normal or non-critical lab and imaging results will be communicated to you by [employer], letter or phone within 4 business days after the clinic has received the results. If you do not hear from us within 7 days, please contact the clinic through [employer] or phone. If you have a critical or abnormal lab result, we will notify you by phone as soon as possible.  Submit refill requests through [employer] or call your pharmacy and they will forward the refill request to us. Please allow 3 business days for your refill to be completed.          Additional Information About Your Visit        MyChart Information     [employer] lets you send messages to your doctor, view your test results, renew your prescriptions, schedule appointments and more. To sign up, go to [email or web address] . Click on \"Log in\" on the left side of the screen, which will take you to the Welcome page. Then click on \"Sign up Now\" on the right side of the page.     You will be asked to enter the access code listed below, as well as some personal information. Please follow the directions to create your username and password.     Your access code is: [email or web address]  Expires: 2018  4:37 AM     Your access code will  in 90 days. If you need help or a new code, please " "call your [facility] or [phone].        Care EveryWhere ID     This is your Care EveryWhere ID. This could be used by other organizations to access your [facility] medical records  [id number]        Your Vitals Were     Pulse Temperature Height Pulse Oximetry BMI (Body Mass Index)       65 96.9  F (36.1  C) (Oral) 5' 7\" (1.702 m) 98% 26 kg/m2        Blood Pressure from Last 3 Encounters:   10/10/17 125/73   10/05/17 108/68   05/24/17 112/66    Weight from Last 3 Encounters:   10/10/17 166 lb (75.3 kg)   10/05/17 164 lb 12.8 oz (74.8 kg)   05/24/17 168 lb 11.2 oz (76.5 kg)              We Performed the Following     DESTRUCT PREMALIGNANT LESION, 2-14     DESTRUCT PREMALIGNANT LESION, FIRST        Primary Care Provider Office Phone # Fax #    Perez Carr [phone] [address] [address]        Equal Access to Services     [facility]: Hadii aad ku hadasho Sograce, waaxda luqadaha, qaybta kaalmatacho torrez, loni garland . So [facility] [phone].    ATENCIÓN: Si habla español, tiene a martinez disposición servicios gratuitos de asistencia lingüística. [facility] [phone].    We comply with applicable federal civil rights laws and [place] laws. We do not discriminate on the basis of race, color, national origin, age, disability, sex, sexual orientation, or gender identity.            Thank you!     Thank you for choosing [facility]  for your care. Our goal is always to provide you with excellent care. Hearing back from our patients is one way we can continue to improve our services. Please take a few minutes to complete the written survey that you may receive in the mail after your visit with us. Thank you!             Your Updated Medication List - Protect others around you: Learn how to safely use, store and throw away your medicines at www.disposemymeds.org.          This list is accurate as of: 10/10/17 11:59 PM.  " Always use your most recent med list.                   Brand Name Dispense Instructions for use Diagnosis    aspirin 81 MG tablet      Take 1 tablet by mouth daily.        CENTRUM SILVER per tablet      Take 1 tablet by mouth daily.        CLARITIN PO      Take  by mouth as needed.        GLUCOSAMINE CHONDROITIN COMPLX PO      Take 1 tablet by mouth daily.        levothyroxine 75 MCG tablet    SYNTHROID/LEVOTHROID    90 tablet    Take 1 tablet (75 mcg) by mouth daily    Hypothyroidism due to acquired atrophy of thyroid       METAMUCIL PO      Take 2 tsp by mouth daily.        simvastatin 10 MG tablet    ZOCOR    90 tablet    Take 1 tablet (10 mg) by mouth At Bedtime    Hyperlipidemia, unspecified hyperlipidemia type       tamsulosin 0.4 MG capsule    FLOMAX    180 capsule    Take 1 capsule (0.4 mg) by mouth 2 times daily    Prostatism       VITAMIN D (CHOLECALCIFEROL) PO      Take 600 tablets by mouth daily

## 2017-10-10 NOTE — PROGRESS NOTES
SUBJECTIVE:   Nilo Nelson is a 77 year old male who presents to clinic today for the following health issues:    Mr. Nelson has a history of basal cell cancer and presents to the clinic for follow up of lesion of the dome of his head. Patient also complains of a pruritic lesion on the right scapula and multiple other lesions on his back.    Patient returns for follow up of lab results from 10/5/2017 and for memory check.    Problem list and histories reviewed & adjusted, as indicated.  Additional history: as documented    Current Outpatient Prescriptions   Medication Sig Dispense Refill     VITAMIN D, CHOLECALCIFEROL, PO Take 600 tablets by mouth daily       tamsulosin (FLOMAX) 0.4 MG capsule Take 1 capsule (0.4 mg) by mouth 2 times daily 180 capsule 3     simvastatin (ZOCOR) 10 MG tablet Take 1 tablet (10 mg) by mouth At Bedtime 90 tablet 3     levothyroxine (SYNTHROID/LEVOTHROID) 75 MCG tablet Take 1 tablet (75 mcg) by mouth daily 90 tablet 3     aspirin 81 MG tablet Take 1 tablet by mouth daily.       Psyllium (METAMUCIL PO) Take 2 tsp by mouth daily.       Multiple Vitamins-Minerals (CENTRUM SILVER) per tablet Take 1 tablet by mouth daily.       GLUCOSAMINE CHONDROITIN COMPLX PO Take 1 tablet by mouth daily.       Loratadine (CLARITIN PO) Take  by mouth as needed.       No Known Allergies    Reviewed and updated as needed this visit by clinical staffTobacco  Allergies  Meds  Soc Hx      Reviewed and updated as needed this visit by Provider       ROS:  Constitutional, HEENT, cardiovascular, pulmonary, gi and gu systems are negative, except as otherwise noted.    This document serves as a record of the services and decisions personally performed and made by Perez Carr MD. It was created on his/her behalf by Bonnie Lauren, a trained medical scribe. The creation of this document is based the provider's statements to the medical scribe.  Andree Lauren 2:54 PM, October 10,  "2017    OBJECTIVE:   /73 (BP Location: Right arm, Patient Position: Sitting, Cuff Size: Adult Small)  Pulse 65  Temp 96.9  F (36.1  C) (Oral)  Ht 1.702 m (5' 7\")  Wt 75.3 kg (166 lb)  SpO2 98%  BMI 26 kg/m2  Body mass index is 26 kg/(m^2).  Neck was supple without adenopathy or thyromegaly his carotids were normal without bruits  Chest clear to auscultation and percussion  Cardiovascular S1 and S2 are physiologic without murmurs or gallops  Abdomen bowel sounds were normal.  There is no palpable mass or organomegaly  Extremities nontender without any edema  Pulses pedal pulses are as described otherwise his pulses are bilaterally symmetrical throughout without bruits  Skin actinic keratoses on his right scapula, right cheek, dome of head and forehead which were treated with LN x2.    Mini mental exam: 28/30    ASSESSMENT/PLAN:   1. Benign essential hypertension    2. Hyperlipidemia LDL goal <130    3. Prostatism    4. Hypothyroidism to acquired atrophy of thyroid    Patient showed no cognitive impairment. 4 lesions were treated with LN.    Except otherwise noted as above, all conditions are stable and medications are tolerated well. Continue related medications unchanged.     Routine follow up at annual physical.     Perez Carr MD  [facility]    The information in this document, created by the medical scribe for me, accurately reflects the services I personally performed and the decisions made by me. I have reviewed and approved this document for accuracy prior to leaving the patient care area.  Perez Carr MD  2:50 PM, 10/10/17       "

## 2018-02-07 DIAGNOSIS — E03.4 HYPOTHYROIDISM DUE TO ACQUIRED ATROPHY OF THYROID: ICD-10-CM

## 2018-02-07 RX ORDER — LEVOTHYROXINE SODIUM 75 UG/1
TABLET ORAL
Qty: 90 TABLET | Refills: 1 | Status: SHIPPED | OUTPATIENT
Start: 2018-02-07 | End: 2018-08-09

## 2018-02-07 NOTE — TELEPHONE ENCOUNTER
"  levothyroxine (SYNTHROID/LEVOTHROID) 75 MCG tablet 90 tablet 3 2/14/2017       Last Written Prescription Date:  02/14/2017  Last Fill Quantity: 90,  # refills: 3   Last Office Visit with G provider:  10/10/2017   Future Office Visit:   Unknown    Requested Prescriptions   Pending Prescriptions Disp Refills     levothyroxine (SYNTHROID/LEVOTHROID) 75 MCG tablet [Pharmacy Med Name: LEVOTHYROXINE 0.075MG (75MCG) TABS] 90 tablet 0     Sig: TAKE 1 TABLET(75 MCG) BY MOUTH DAILY    Thyroid Protocol Passed    2/7/2018  1:33 PM       Passed - Patient is 12 years or older       Passed - Recent or future visit with authorizing provider's specialty    Patient had office visit in the last year or has a visit in the next 30 days with authorizing provider.  See \"Patient Info\" tab in inbasket, or \"Choose Columns\" in Meds & Orders section of the refill encounter.            Passed - Normal TSH on file in past 12 months    Recent Labs   Lab Test  10/05/17   1057   TSH  1.47                "

## 2018-03-08 DIAGNOSIS — E78.5 HYPERLIPIDEMIA, UNSPECIFIED HYPERLIPIDEMIA TYPE: ICD-10-CM

## 2018-03-08 RX ORDER — SIMVASTATIN 10 MG
TABLET ORAL
Qty: 90 TABLET | Refills: 1 | Status: SHIPPED | OUTPATIENT
Start: 2018-03-08 | End: 2018-09-06

## 2018-05-03 ENCOUNTER — OFFICE VISIT (OUTPATIENT)
Dept: FAMILY MEDICINE | Facility: CLINIC | Age: 78
End: 2018-05-03
Payer: COMMERCIAL

## 2018-05-03 VITALS
TEMPERATURE: 97.2 F | HEART RATE: 76 BPM | RESPIRATION RATE: 18 BRPM | DIASTOLIC BLOOD PRESSURE: 61 MMHG | BODY MASS INDEX: 25.74 KG/M2 | SYSTOLIC BLOOD PRESSURE: 118 MMHG | HEIGHT: 67 IN | WEIGHT: 164 LBS | OXYGEN SATURATION: 98 %

## 2018-05-03 DIAGNOSIS — L30.9 DERMATITIS: Primary | ICD-10-CM

## 2018-05-03 DIAGNOSIS — I10 BENIGN ESSENTIAL HYPERTENSION: ICD-10-CM

## 2018-05-03 DIAGNOSIS — E03.4 HYPOTHYROIDISM DUE TO ACQUIRED ATROPHY OF THYROID: ICD-10-CM

## 2018-05-03 DIAGNOSIS — L57.0 AK (ACTINIC KERATOSIS): ICD-10-CM

## 2018-05-03 DIAGNOSIS — E78.5 HYPERLIPIDEMIA LDL GOAL <130: ICD-10-CM

## 2018-05-03 PROCEDURE — 17110 DESTRUCTION B9 LES UP TO 14: CPT | Performed by: INTERNAL MEDICINE

## 2018-05-03 PROCEDURE — 99212 OFFICE O/P EST SF 10 MIN: CPT | Mod: 25 | Performed by: INTERNAL MEDICINE

## 2018-05-03 NOTE — NURSING NOTE
"Chief Complaint   Patient presents with     Derm Problem     Lesion on Dome of head-follow up       Initial /61 (BP Location: Left arm, Patient Position: Chair, Cuff Size: Adult Large)  Pulse 76  Temp 97.2  F (36.2  C) (Oral)  Resp 18  Ht 5' 7\" (1.702 m)  Wt 164 lb (74.4 kg)  SpO2 98%  BMI 25.69 kg/m2 Estimated body mass index is 25.69 kg/(m^2) as calculated from the following:    Height as of this encounter: 5' 7\" (1.702 m).    Weight as of this encounter: 164 lb (74.4 kg).  Medication Reconciliation: complete   Becky Mullins CMA (AAMA)      "

## 2018-05-03 NOTE — MR AVS SNAPSHOT
"              After Visit Summary   5/3/2018    Nilo Nelson    MRN: [id number]           Patient Information     Date Of Birth          1940        Visit Information        Provider Department      5/3/2018 10:00 AM Perez Carr MD [facility]        Today's Diagnoses     Dermatitis    -  1    Benign essential hypertension        Hyperlipidemia LDL goal <130        Hypothyroidism due to acquired atrophy of thyroid        AK (actinic keratosis)           Follow-ups after your visit        Who to contact     If you have questions or need follow up information about today's clinic visit or your schedule please contact [facility] directly at [phone].  Normal or non-critical lab and imaging results will be communicated to you by MyChart, letter or phone within 4 business days after the clinic has received the results. If you do not hear from us within 7 days, please contact the clinic through [employer] or phone. If you have a critical or abnormal lab result, we will notify you by phone as soon as possible.  Submit refill requests through [employer] or call your pharmacy and they will forward the refill request to us. Please allow 3 business days for your refill to be completed.          Additional Information About Your Visit        MyChart Information     [employer] lets you send messages to your doctor, view your test results, renew your prescriptions, schedule appointments and more. To sign up, go to [email or web address] . Click on \"Log in\" on the left side of the screen, which will take you to the Welcome page. Then click on \"Sign up Now\" on the right side of the page.     You will be asked to enter the access code listed below, as well as some personal information. Please follow the directions to create your username and password.     Your access code is: [email or web address]  Expires: 2018  5:44 AM     Your access code will  in 90 days. If you need help or a new code, please " "call your [facility] or [phone].        Care EveryWhere ID     This is your Care EveryWhere ID. This could be used by other organizations to access your [facility] medical records  [id number]        Your Vitals Were     Pulse Temperature Respirations Height Pulse Oximetry BMI (Body Mass Index)    76 97.2  F (36.2  C) (Oral) 18 5' 7\" (1.702 m) 98% 25.69 kg/m2       Blood Pressure from Last 3 Encounters:   05/03/18 118/61   10/10/17 125/73   10/05/17 108/68    Weight from Last 3 Encounters:   05/03/18 164 lb (74.4 kg)   10/10/17 166 lb (75.3 kg)   10/05/17 164 lb 12.8 oz (74.8 kg)              We Performed the Following     DESTRUCT PREMALIGNANT LESION, 2-14     DESTRUCT PREMALIGNANT LESION, FIRST        Primary Care Provider Office Phone # Fax #    Perez Carr [phone] [address] [address]        Equal Access to Services     [facility]: Hadii amos ku hadmendoza Sograce, waaxda lumartina, qaybta kashirley torrez, loni garland . So [facility] [phone].    ATENCIÓN: Si habla español, tiene a martinez disposición servicios gratuitos de asistencia lingüística. [facility] [phone].    We comply with applicable federal civil rights laws and [place] laws. We do not discriminate on the basis of race, color, national origin, age, disability, sex, sexual orientation, or gender identity.            Thank you!     Thank you for choosing [facility]  for your care. Our goal is always to provide you with excellent care. Hearing back from our patients is one way we can continue to improve our services. Please take a few minutes to complete the written survey that you may receive in the mail after your visit with us. Thank you!             Your Updated Medication List - Protect others around you: Learn how to safely use, store and throw away your medicines at www.disposemymeds.org.          This list is accurate as of 5/3/18 11:59 " PM.  Always use your most recent med list.                   Brand Name Dispense Instructions for use Diagnosis    aspirin 81 MG tablet      Take 1 tablet by mouth daily.        CENTRUM SILVER per tablet      Take 1 tablet by mouth daily.        CLARITIN PO      Take  by mouth as needed.        GLUCOSAMINE CHONDROITIN COMPLX PO      Take 1 tablet by mouth daily.        levothyroxine 75 MCG tablet    SYNTHROID/LEVOTHROID    90 tablet    TAKE 1 TABLET(75 MCG) BY MOUTH DAILY    Hypothyroidism due to acquired atrophy of thyroid       METAMUCIL PO      Take 2 tsp by mouth daily.        simvastatin 10 MG tablet    ZOCOR    90 tablet    TAKE 1 TABLET(10 MG) BY MOUTH AT BEDTIME    Hyperlipidemia, unspecified hyperlipidemia type       tamsulosin 0.4 MG capsule    FLOMAX    180 capsule    Take 1 capsule (0.4 mg) by mouth 2 times daily    Prostatism       VITAMIN D (CHOLECALCIFEROL) PO      Take 600 tablets by mouth daily

## 2018-05-03 NOTE — PROGRESS NOTES
SUBJECTIVE:                                                    Nilo Nelson is a 77 year old male who presents to clinic today for the following health issues:    Lesions      Duration: ongoing 6+ months    Description (location/character/radiation): Multiple lesions on top of scalp.     Intensity:  mild    Accompanying signs and symptoms: No itching; No pain; Bleed sometimes; Scab over-fall off-returns; Multiple on top of head and edge of Rt ear    History (similar episodes/previous evaluation): Personal Hx of Basal Cell Carcinoma; No family Hx of other Skin cancers     Precipitating or alleviating factors: None    Therapies tried and outcome: Wearing Hat year round; Sun Screen-daily when in [place]     Patient presents to the clinic to follow up on lesions on his scalp. He has history of basal cell carcinoma. He states that they have persisted for 6 months. He denies any itching or pain from the lesions. He does affirm to occasional bleeding. He wears a hat year round and wears good sunscreen daily when in [place].      Problem list and histories reviewed & adjusted, as indicated.  Additional history: as documented    Current Outpatient Prescriptions   Medication Sig Dispense Refill     aspirin 81 MG tablet Take 1 tablet by mouth daily.       GLUCOSAMINE CHONDROITIN COMPLX PO Take 1 tablet by mouth daily.       levothyroxine (SYNTHROID/LEVOTHROID) 75 MCG tablet TAKE 1 TABLET(75 MCG) BY MOUTH DAILY 90 tablet 1     Loratadine (CLARITIN PO) Take  by mouth as needed.       Multiple Vitamins-Minerals (CENTRUM SILVER) per tablet Take 1 tablet by mouth daily.       Psyllium (METAMUCIL PO) Take 2 tsp by mouth daily.       simvastatin (ZOCOR) 10 MG tablet TAKE 1 TABLET(10 MG) BY MOUTH AT BEDTIME 90 tablet 1     tamsulosin (FLOMAX) 0.4 MG capsule Take 1 capsule (0.4 mg) by mouth 2 times daily 180 capsule 3     VITAMIN D, CHOLECALCIFEROL, PO Take 600 tablets by mouth daily       No Known  "Allergies    ROS:  Constitutional, HEENT, cardiovascular, pulmonary, gi and gu systems are negative, except as otherwise noted.    This document serves as a record of the services and decisions personally performed and made by Perez Carr MD. It was created on his/her behalf by French Dial, a trained medical scribe. The creation of this document is based the provider's statements to the medical scribe.  Scribkatja Dial 10:22 AM, May 3, 2018    OBJECTIVE:     /61 (BP Location: Left arm, Patient Position: Chair, Cuff Size: Adult Large)  Pulse 76  Temp 97.2  F (36.2  C) (Oral)  Resp 18  Ht 1.702 m (5' 7\")  Wt 74.4 kg (164 lb)  SpO2 98%  BMI 25.69 kg/m2  Body mass index is 25.69 kg/(m^2).    HEENT: he had three areas of erythematous, scaly skin consistent with actinic keratoses on the dome of his head and one on his right ear, the rest of his head and neck was normal    Procedure: Three areas of actinic keratoses on the dome of his scalp and one on his right ear had liquid nitrogen applied x3    Diagnostic Test Results:  none     ASSESSMENT/PLAN:     1. Dermatitis  -Treated with liquid nitrogen    2. Benign essential hypertension  -Well controlled with current therapies.    3. Hyperlipidemia LDL goal <130  -Well be reevaluated at next visit.      Perez Carr MD  [facility]    The information in this document, created by the medical scribe for me, accurately reflects the services I personally performed and the decisions made by me. I have reviewed and approved this document for accuracy prior to leaving the patient care area.  Perez Carr MD  10:33 AM, 05/03/18          "

## 2018-06-04 DIAGNOSIS — N40.0 PROSTATISM: ICD-10-CM

## 2018-06-05 NOTE — TELEPHONE ENCOUNTER
"  tamsulosin (FLOMAX) 0.4 MG capsule 180 capsule 3 5/30/2017       Last Written Prescription Date:  05/30/2017  Last Fill Quantity: 180,  # refills: 3   Last office visit: 5/3/2018 with prescribing provider:     Future Office Visit:  Unknown    Requested Prescriptions   Pending Prescriptions Disp Refills     tamsulosin (FLOMAX) 0.4 MG capsule [Pharmacy Med Name: TAMSULOSIN 0.4MG CAPSULES] 180 capsule 0     Sig: TAKE 1 CAPSULE(0.4 MG) BY MOUTH TWICE DAILY    Alpha Blockers Passed    6/4/2018  4:12 PM       Passed - Blood pressure under 140/90 in past 12 months    BP Readings from Last 3 Encounters:   05/03/18 118/61   10/10/17 125/73   10/05/17 108/68                Passed - Recent (12 mo) or future (30 days) visit within the authorizing provider's specialty    Patient had office visit in the last 12 months or has a visit in the next 30 days with authorizing provider or within the authorizing provider's specialty.  See \"Patient Info\" tab in inbasket, or \"Choose Columns\" in Meds & Orders section of the refill encounter.           Passed - Patient does not have Tadalafil, Vardenafil, or Sildenafil on their medication list       Passed - Patient is 18 years of age or older          "

## 2018-06-06 RX ORDER — TAMSULOSIN HYDROCHLORIDE 0.4 MG/1
CAPSULE ORAL
Qty: 180 CAPSULE | Refills: 3 | Status: SHIPPED | OUTPATIENT
Start: 2018-06-06 | End: 2019-06-07

## 2018-08-01 ENCOUNTER — OFFICE VISIT (OUTPATIENT)
Dept: FAMILY MEDICINE | Facility: CLINIC | Age: 78
End: 2018-08-01
Payer: COMMERCIAL

## 2018-08-01 VITALS
HEART RATE: 72 BPM | WEIGHT: 164 LBS | BODY MASS INDEX: 25.69 KG/M2 | DIASTOLIC BLOOD PRESSURE: 67 MMHG | TEMPERATURE: 97.6 F | SYSTOLIC BLOOD PRESSURE: 106 MMHG | OXYGEN SATURATION: 96 %

## 2018-08-01 DIAGNOSIS — E78.5 HYPERLIPIDEMIA LDL GOAL <130: ICD-10-CM

## 2018-08-01 DIAGNOSIS — I10 BENIGN ESSENTIAL HYPERTENSION: ICD-10-CM

## 2018-08-01 DIAGNOSIS — E03.4 HYPOTHYROIDISM DUE TO ACQUIRED ATROPHY OF THYROID: ICD-10-CM

## 2018-08-01 DIAGNOSIS — N40.0 PROSTATISM: ICD-10-CM

## 2018-08-01 DIAGNOSIS — L57.0 ACTINIC KERATOSIS: Primary | ICD-10-CM

## 2018-08-01 PROCEDURE — 17003 DESTRUCT PREMALG LES 2-14: CPT | Performed by: INTERNAL MEDICINE

## 2018-08-01 PROCEDURE — 17000 DESTRUCT PREMALG LESION: CPT | Performed by: INTERNAL MEDICINE

## 2018-08-01 PROCEDURE — 99213 OFFICE O/P EST LOW 20 MIN: CPT | Mod: 25 | Performed by: INTERNAL MEDICINE

## 2018-08-01 NOTE — PROGRESS NOTES
SUBJECTIVE:   Nilo Nelson is a 77 year old male who presents to clinic today for the following health issues:      Lesions         Problem list and histories reviewed & adjusted, as indicated.  Additional history: as documented    Current Outpatient Prescriptions   Medication Sig Dispense Refill     aspirin 81 MG tablet Take 1 tablet by mouth daily.       GLUCOSAMINE CHONDROITIN COMPLX PO Take 1 tablet by mouth daily.       Loratadine (CLARITIN PO) Take  by mouth as needed.       Multiple Vitamins-Minerals (CENTRUM SILVER) per tablet Take 1 tablet by mouth daily.       Psyllium (METAMUCIL PO) Take 2 tsp by mouth daily.       tamsulosin (FLOMAX) 0.4 MG capsule TAKE 1 CAPSULE(0.4 MG) BY MOUTH TWICE DAILY 180 capsule 3     VITAMIN D, CHOLECALCIFEROL, PO Take 600 tablets by mouth daily       levothyroxine (SYNTHROID/LEVOTHROID) 75 MCG tablet TAKE 1 TABLET(75 MCG) BY MOUTH DAILY 90 tablet 3     simvastatin (ZOCOR) 10 MG tablet TAKE 1 TABLET(10 MG) BY MOUTH AT BEDTIME 90 tablet 3       Reviewed and updated as needed this visit by clinical staff  Allergies  Meds       Reviewed and updated as needed this visit by Provider         ROS:  Constitutional, EENT, cardiovascular, pulmonary, gi and gu systems are negative, except as otherwise noted.    OBJECTIVE:     /67 (BP Location: Left arm, Patient Position: Sitting, Cuff Size: Adult Regular)  Pulse 72  Temp 97.6  F (36.4  C) (Tympanic)  Wt 164 lb (74.4 kg)  SpO2 96%  BMI 25.69 kg/m2  Body mass index is 25.69 kg/(m^2).  GENERAL: healthy, alert and no distress  NECK: no adenopathy, no asymmetry, masses, or scars and thyroid normal to palpation  RESP: lungs clear to auscultation - no rales, rhonchi or wheezes  CV: regular rate and rhythm, normal S1 S2, no S3 or S4, no murmur, click or rub, no peripheral edema and peripheral pulses strong  ABDOMEN: soft, nontender, no hepatosplenomegaly, no masses and bowel sounds normal  MS: no gross musculoskeletal defects  noted, no edema  Skin there are 3 areas of actinic keratoses, dome of his head, right ear and right forehead    Procedure cryotherapy for actinic keratoses:  The actinic keratoses on his right ear, right forehead and the dome of his scalp were treated with liquid nitrogen x2 each    ASSESSMENT/PLAN:             1. Actinic keratosis  Local care as discussed and reevaluate if there is residual scaliness or poor healing  2. Hypothyroidism due to acquired atrophy of thyroid  Continue with same dose of L-thyroxine.  Scheduled for follow-up wellness exam    3. Benign essential hypertension  Well-controlled on current therapy    4. Hyperlipidemia LDL goal <130  Scheduled for recheck at wellness exam    5. Prostatism  No change in medication          Perez Carr MD  [facility]

## 2018-08-01 NOTE — MR AVS SNAPSHOT
After Visit Summary   8/1/2018    Nilo Nelson    MRN: [id number]           Patient Information     Date Of Birth          1940        Visit Information        Provider Department      8/1/2018 4:00 PM Perez Carr MD [facility]        Today's Diagnoses     Actinic keratosis    -  1    Hypothyroidism due to acquired atrophy of thyroid        Benign essential hypertension        Hyperlipidemia LDL goal <130        Prostatism           Follow-ups after your visit        Who to contact     If you have questions or need follow up information about today's clinic visit or your schedule please contact [facility] directly at [phone].  Normal or non-critical lab and imaging results will be communicated to you by MyChart, letter or phone within 4 business days after the clinic has received the results. If you do not hear from us within 7 days, please contact the clinic through MyChart or phone. If you have a critical or abnormal lab result, we will notify you by phone as soon as possible.  Submit refill requests through [employer] or call your pharmacy and they will forward the refill request to us. Please allow 3 business days for your refill to be completed.          Additional Information About Your Visit        Care EveryWhere ID     This is your Care EveryWhere ID. This could be used by other organizations to access your [facility] medical records  [id number]        Your Vitals Were     Pulse Temperature Pulse Oximetry BMI (Body Mass Index)          72 97.6  F (36.4  C) (Tympanic) 96% 25.69 kg/m2         Blood Pressure from Last 3 Encounters:   10/22/18 113/71   10/09/18 112/72   09/19/18 132/80    Weight from Last 3 Encounters:   10/22/18 162 lb (73.5 kg)   10/09/18 162 lb (73.5 kg)   09/19/18 165 lb (74.8 kg)              We Performed the Following     DESTRUCT PREMALIGNANT LESION, 2-14     DESTRUCT PREMALIGNANT LESION, FIRST        Primary Care Provider Office Phone  # Fax #    Perez GIULIANA Carr [phone] [address] [address]        Equal Access to Services     SHASHANK GOLDSTEIN : Hadalannah amos ku ankuro Soarnoldoali, waaxda luqadaha, qaybta kaalmada adechristen, loni [place] laRolandocandace brooks. So [facility] [phone].    ATENCIÓN: Si habla español, tiene a martinez disposición servicios gratuitos de asistencia lingüística. Llame al [phone].    We comply with applicable federal civil rights laws and [place] laws. We do not discriminate on the basis of race, color, national origin, age, disability, sex, sexual orientation, or gender identity.            Thank you!     Thank you for choosing [facility]  for your care. Our goal is always to provide you with excellent care. Hearing back from our patients is one way we can continue to improve our services. Please take a few minutes to complete the written survey that you may receive in the mail after your visit with us. Thank you!             Your Updated Medication List - Protect others around you: Learn how to safely use, store and throw away your medicines at www.disposemymeds.org.          This list is accurate as of 8/1/18 11:59 PM.  Always use your most recent med list.                   Brand Name Dispense Instructions for use Diagnosis    aspirin 81 MG tablet      Take 1 tablet by mouth daily.        CENTRUM SILVER per tablet      Take 1 tablet by mouth daily.        CLARITIN PO      Take  by mouth as needed.        GLUCOSAMINE CHONDROITIN COMPLX PO      Take 1 tablet by mouth daily.        METAMUCIL PO      Take 2 tsp by mouth daily.        tamsulosin 0.4 MG capsule    FLOMAX    180 capsule    TAKE 1 CAPSULE(0.4 MG) BY MOUTH TWICE DAILY    Prostatism       VITAMIN D (CHOLECALCIFEROL) PO      Take 600 tablets by mouth daily

## 2018-08-09 DIAGNOSIS — E03.4 HYPOTHYROIDISM DUE TO ACQUIRED ATROPHY OF THYROID: ICD-10-CM

## 2018-08-09 NOTE — TELEPHONE ENCOUNTER
"Last Written Prescription Date:  2/07/18  Last Fill Quantity: 90 tablet,  # refills: 1   Last office visit: 8/1/2018 with prescribing provider:  Lilly   Future Office Visit:   Next 5 appointments (look out 90 days)     Oct 09, 2018  9:30 AM CDT   [facility] Perez [facility] MD   [facility]    [address] [facility] [id number] [phone]                   Requested Prescriptions   Pending Prescriptions Disp Refills     levothyroxine (SYNTHROID/LEVOTHROID) 75 MCG tablet [Pharmacy Med Name: LEVOTHYROXINE 0.075MG (75MCG) TABS] 90 tablet 0     Sig: TAKE 1 TABLET(75 MCG) BY MOUTH DAILY    Thyroid Protocol Passed    8/9/2018  9:57 AM       Passed - Patient is 12 years or older       Passed - Recent (12 mo) or future (30 days) visit within the authorizing provider's specialty    Patient had office visit in the last 12 months or has a visit in the next 30 days with authorizing provider or within the authorizing provider's specialty.  See \"Patient Info\" tab in inbasket, or \"Choose Columns\" in Meds & Orders section of the refill encounter.           Passed - Normal TSH on file in past 12 months    Recent Labs   Lab Test  10/05/17   1057   TSH  1.47                "

## 2018-08-10 RX ORDER — LEVOTHYROXINE SODIUM 75 UG/1
TABLET ORAL
Qty: 90 TABLET | Refills: 0 | Status: SHIPPED | OUTPATIENT
Start: 2018-08-10 | End: 2018-11-05

## 2018-08-10 NOTE — TELEPHONE ENCOUNTER
Prescription approved per [facility] Refill Protocol.  Due for labs Oct 2018 - has PE scheduled  Roshni ARGUETA RN

## 2018-09-06 DIAGNOSIS — E78.5 HYPERLIPIDEMIA, UNSPECIFIED HYPERLIPIDEMIA TYPE: ICD-10-CM

## 2018-09-07 DIAGNOSIS — E78.5 HYPERLIPIDEMIA, UNSPECIFIED HYPERLIPIDEMIA TYPE: ICD-10-CM

## 2018-09-07 RX ORDER — SIMVASTATIN 10 MG
TABLET ORAL
Qty: 30 TABLET | Refills: 0 | Status: SHIPPED | OUTPATIENT
Start: 2018-09-07 | End: 2018-10-09

## 2018-09-07 RX ORDER — SIMVASTATIN 10 MG
TABLET ORAL
Qty: 1 TABLET | Refills: 0 | OUTPATIENT
Start: 2018-09-07

## 2018-09-07 NOTE — TELEPHONE ENCOUNTER
"simvastatin (ZOCOR) 10 MG tablet 90 tablet 1 3/8/2018  No   Sig: TAKE 1 TABLET(10 MG) BY MOUTH AT BEDTIME     Last Written Prescription Date:  03/08/2018  Last Fill Quantity: 90,  # refills: 1   Last office visit: Last saw Dr. Carr 8-1-2018 :     Future Office Visit:   Next 5 appointments (look out 90 days)     Sep 19, 2018  3:00 PM CDT   Office Visit with Perez Carr MD   [facility]    [address] [address]   [phone]            Oct 09, 2018  9:30 AM CDT   PHYSICAL with Perez Carr MD   [facility]    [address] [address]   [phone]                 Requested Prescriptions   Pending Prescriptions Disp Refills     simvastatin (ZOCOR) 10 MG tablet [Pharmacy Med Name: SIMVASTATIN 10MG TABLETS] 90 tablet 0     Sig: TAKE 1 TABLET(10 MG) BY MOUTH AT BEDTIME    Statins Protocol Passed    9/6/2018  2:34 PM       Passed - LDL on file in past 12 months    Recent Labs   Lab Test  10/05/17   1057   LDL  96            Passed - No abnormal creatine kinase in past 12 months    No lab results found.            Passed - Recent (12 mo) or future (30 days) visit within the authorizing provider's specialty    Patient had office visit in the last 12 months or has a visit in the next 30 days with authorizing provider or within the authorizing provider's specialty.  See \"Patient Info\" tab in inbasket, or \"Choose Columns\" in Meds & Orders section of the refill encounter.           Passed - Patient is age 18 or older          "

## 2018-09-07 NOTE — TELEPHONE ENCOUNTER
Lab Results   Component Value Date    ALT 27 10/05/2017     LDL Cholesterol Calculated   Date Value Ref Range Status   10/05/2017 96 <100 mg/dL Final     Comment:     Desirable:       <100 mg/dl     Message to pharmacy:Pt scheduled to see  on 9/19/118. May discuss refills at that time.

## 2018-09-19 ENCOUNTER — OFFICE VISIT (OUTPATIENT)
Dept: FAMILY MEDICINE | Facility: CLINIC | Age: 78
End: 2018-09-19
Payer: COMMERCIAL

## 2018-09-19 VITALS
SYSTOLIC BLOOD PRESSURE: 132 MMHG | HEIGHT: 67 IN | BODY MASS INDEX: 25.9 KG/M2 | HEART RATE: 71 BPM | DIASTOLIC BLOOD PRESSURE: 80 MMHG | WEIGHT: 165 LBS | OXYGEN SATURATION: 95 % | TEMPERATURE: 97.4 F

## 2018-09-19 DIAGNOSIS — Z23 NEED FOR PROPHYLACTIC VACCINATION AND INOCULATION AGAINST INFLUENZA: ICD-10-CM

## 2018-09-19 DIAGNOSIS — S43.422A SPRAIN OF LEFT ROTATOR CUFF CAPSULE, INITIAL ENCOUNTER: Primary | ICD-10-CM

## 2018-09-19 PROCEDURE — 90662 IIV NO PRSV INCREASED AG IM: CPT | Performed by: INTERNAL MEDICINE

## 2018-09-19 PROCEDURE — G0008 ADMIN INFLUENZA VIRUS VAC: HCPCS | Performed by: INTERNAL MEDICINE

## 2018-09-19 PROCEDURE — 99213 OFFICE O/P EST LOW 20 MIN: CPT | Mod: 25 | Performed by: INTERNAL MEDICINE

## 2018-09-19 NOTE — MR AVS SNAPSHOT
After Visit Summary   9/19/2018    Nilo Nelson    MRN: [id number]           Patient Information     Date Of Birth          1940        Visit Information        Provider Department      9/19/2018 3:00 PM Perez Carr MD [facility]        Today's Diagnoses     Sprain of left rotator cuff capsule, initial encounter    -  1    Need for prophylactic vaccination and inoculation against influenza           Follow-ups after your visit        Additional Services     [facility] PT, HAND, AND CHIROPRACTIC REFERRAL       **This order will print in the [facility] Scheduling Office**    Physical Therapy, Hand Therapy and Chiropractic Care are available through:    *[facility]  *[facility]  *[facility]    Call one number to schedule at any of the above locations: [phone].    Your provider has referred you to: Physical Therapy at [facility] or [facility] [place]    Indication/Reason for Referral: Shoulder Pain  Onset of Illness: Recent  Therapy Orders: Evaluate and Treat  Special Programs: None  Special Request: None    Additional Comments for the Therapist or Chiropractor:     Please be aware that coverage of these services is subject to the terms and limitations of your health insurance plan.  Call member services at your health plan with any benefit or coverage questions.      Please bring the following to your appointment:    *Your personal calendar for scheduling future appointments  *Comfortable clothing                  Your next 10 appointments already scheduled     Sep 20, 2018  2:30 PM CDT   (Arrive by 2:15 PM)   DAYANARA Extremity with Bryan Gaspar PT   [facility] ([facility]  )    [address] [place] [address] [address] [place] [id number] [phone]            Oct 09, 2018  9:30 AM CDT   PHYSICAL with Perez Carr MD   [facility] ([facility]    [address] [place] [id number]  "  [phone]              Future tests that were ordered for you today     Open Future Orders        Priority Expected Expires Ordered    [facility] PT, HAND, AND CHIROPRACTIC REFERRAL Routine  9/19/2019 9/19/2018            Who to contact     If you have questions or need follow up information about today's clinic visit or your schedule please contact [facility] directly at [phone].  Normal or non-critical lab and imaging results will be communicated to you by MyChart, letter or phone within 4 business days after the clinic has received the results. If you do not hear from us within 7 days, please contact the clinic through MyChart or phone. If you have a critical or abnormal lab result, we will notify you by phone as soon as possible.  Submit refill requests through [employer] or call your pharmacy and they will forward the refill request to us. Please allow 3 business days for your refill to be completed.          Additional Information About Your Visit        Care EveryWhere ID     This is your Care EveryWhere ID. This could be used by other organizations to access your [place] medical records  [id number]        Your Vitals Were     Pulse Temperature Height Pulse Oximetry BMI (Body Mass Index)       71 97.4  F (36.3  C) (Tympanic) 5' 7\" (1.702 m) 95% 25.84 kg/m2        Blood Pressure from Last 3 Encounters:   09/19/18 132/80   08/01/18 106/67   05/03/18 118/61    Weight from Last 3 Encounters:   09/19/18 165 lb (74.8 kg)   08/01/18 164 lb (74.4 kg)   05/03/18 164 lb (74.4 kg)              We Performed the Following     ADMIN INFLUENZA (For MEDICARE Patients ONLY) []     FLU VACCINE, INCREASED ANTIGEN, PRESV FREE, AGE 65+ [39815]     Vaccine Administration, Initial [49479]        Primary Care Provider Office Phone # Fax #    Perez Carr [phone] [address]  [facility] [address]        Equal Access to Services     SHASHANK GOLDSTEIN AH: Murali elizondo " Sana, cornelioda lushaunadaha, qaybta kashirley torrez, loni annin hayaan jemalabdirizak ayleenkimberlyn laRolandocandace otdd. So [facility] [phone].    ATENCIÓN: Si tremaine henao, tiene a martinez disposición servicios gratuitos de asistencia lingüística. Cesar al [phone].    We comply with applicable federal civil rights laws and [place] laws. We do not discriminate on the basis of race, color, national origin, age, disability, sex, sexual orientation, or gender identity.            Thank you!     Thank you for choosing [facility]  for your care. Our goal is always to provide you with excellent care. Hearing back from our patients is one way we can continue to improve our services. Please take a few minutes to complete the written survey that you may receive in the mail after your visit with us. Thank you!             Your Updated Medication List - Protect others around you: Learn how to safely use, store and throw away your medicines at www.disposemymeds.org.          This list is accurate as of 9/19/18 11:59 PM.  Always use your most recent med list.                   Brand Name Dispense Instructions for use Diagnosis    aspirin 81 MG tablet      Take 1 tablet by mouth daily.        CENTRUM SILVER per tablet      Take 1 tablet by mouth daily.        CLARITIN PO      Take  by mouth as needed.        GLUCOSAMINE CHONDROITIN COMPLX PO      Take 1 tablet by mouth daily.        levothyroxine 75 MCG tablet    SYNTHROID/LEVOTHROID    90 tablet    TAKE 1 TABLET(75 MCG) BY MOUTH DAILY    Hypothyroidism due to acquired atrophy of thyroid       METAMUCIL PO      Take 2 tsp by mouth daily.        simvastatin 10 MG tablet    ZOCOR    30 tablet    TAKE 1 TABLET(10 MG) BY MOUTH AT BEDTIME    Hyperlipidemia, unspecified hyperlipidemia type       tamsulosin 0.4 MG capsule    FLOMAX    180 capsule    TAKE 1 CAPSULE(0.4 MG) BY MOUTH TWICE DAILY    Prostatism       VITAMIN D (CHOLECALCIFEROL) PO      Take 600 tablets by mouth daily

## 2018-09-19 NOTE — PROGRESS NOTES
SUBJECTIVE:   Nilo Nelson is a 78 year old male who presents to clinic today for the following health issues:    Musculoskeletal Problem / Pain  Nilo has been lifting boxes recently and complains of a strain in his left arm and shoulder, onset 6 weeks ago. He reports a similar previous pain in his right arm that has resolved. Left lateral raise exacerbates pain -- states that some arm motions hurt more than others. He has been treating with OTC ibuprofin with little symptomatc relief.    Duration: 1 month    Description  Location: left shoulder    Intensity:  varies    Accompanying signs and symptoms: none    History  Previous similar problem: YES- but right shoulder  Previous evaluation:  none    Precipitating or alleviating factors:  Trauma or overuse: YES- pt suspects he pulled it may have over did it when lifting boxes  Aggravating factors include: lifting and moving it in certain directions    Therapies tried and outcome: nothing    Problem list and histories reviewed & adjusted, as indicated.  Additional history: as documented    PAST MEDICAL HISTORY:   Past Medical History:   Diagnosis Date     Hernia of unspecified site of abdominal cavity without mention of obstruction or gangrene      Hyperlipidemia      Hypothyroid      Prostatism      PAST SURGICAL HISTORY:   Past Surgical History:   Procedure Laterality Date     BACK SURGERY      LUMBAR DISC     COLONOSCOPY  10/18/2012    Procedure: COLONOSCOPY;  COLONOSCOPY ;  Surgeon: Zain Lockhart MD;  Location:  GI     LAPAROSCOPIC HERNIORRHAPHY INGUINAL BILATERAL  10/18/2011    Procedure:LAPAROSCOPIC HERNIORRHAPHY INGUINAL BILATERAL; Laparoscopic Bilateral Inguinal Hernia Repair with Mesh; Surgeon:MITESH GREENFIELD; [facility]     FAMILY HISTORY: No family history on file.    SOCIAL HISTORY:   Social History   Substance Use Topics     Smoking status: Former Smoker     Smokeless tobacco: Never Used      Comment: per encounter 3/9/07     Alcohol use  "0.0 oz/week     0 Standard drinks or equivalent per week      Comment: 1 day     Reviewed and updated as needed this visit by clinical staff  Tobacco  Allergies  Meds  Problems       Reviewed and updated as needed this visit by Provider       ROS:  Constitutional, HEENT, cardiovascular, pulmonary, GI, , musculoskeletal, neuro, skin, endocrine and psych systems are negative, except as otherwise noted.    This document serves as a record of the services and decisions personally performed and made by Perez Carr MD. It was created on his behalf by Rico Chino, a trained medical scribe.  The creation of this document is based on the scribe's personal observations and the provider's statements to the medical scribe.  Rico Chino, September 19, 2018 3:25 PM  OBJECTIVE:   /80 (BP Location: Right arm, Cuff Size: Adult Regular)  Pulse 71  Temp 97.4  F (36.3  C) (Tympanic)  Ht 1.702 m (5' 7\")  Wt 74.8 kg (165 lb)  SpO2 95%  BMI 25.84 kg/m2  Body mass index is 25.84 kg/(m^2).  GENERAL: healthy, alert and no distress  NECK: no adenopathy, no asymmetry, masses, or scars and thyroid normal to palpation  RESP: lungs clear to auscultation - no rales, rhonchi or wheezes  CV: regular rate and rhythm, normal S1 S2, no S3 or S4, no murmur, click or rub, no peripheral edema and peripheral pulses strong  ABDOMEN: soft, nontender, no hepatosplenomegaly, no masses and bowel sounds normal  MS: Left Shoulder: No pain to palpation, flection and extension normal, impingement sign negative, tendons intactStrength intact  PSYCH: mentation appears normal, affect normal/bright    Diagnostic Test Results:  No results found for this or any previous visit (from the past 24 hour(s)).  ASSESSMENT/PLAN:   1. Sprain of left rotator cuff capsule, initial encounter  Likely overworked.  Use NSAID's PRN  - [facility] PT, HAND, AND CHIROPRACTIC REFERRAL; Future    2. Need for prophylactic vaccination and inoculation against " influenza  Administered  - FLU VACCINE, INCREASED ANTIGEN, PRESV FREE, AGE 65+ [78703]  - Vaccine Administration, Initial [26059]  - ADMIN INFLUENZA (For MEDICARE Patients ONLY) []    The information in this document, created by the medical scribe for me, accurately reflects the services I personally performed and the decisions made by me. I have reviewed and approved this document for accuracy prior to leaving the patient care area.  September 19, 2018 3:38 PM    Perez Carr MD  [facility]

## 2018-09-19 NOTE — PROGRESS NOTES
Injectable Influenza Immunization Documentation    1.  Is the person to be vaccinated sick today?   No    2. Does the person to be vaccinated have an allergy to a component   of the vaccine?   No  Egg Allergy Algorithm Link    3. Has the person to be vaccinated ever had a serious reaction   to influenza vaccine in the past?   No    4. Has the person to be vaccinated ever had Guillain-Barré syndrome?   No    Form completed by Kathie Neumann CMA  Prior to injection verified patient identity using patient's name and date of birth.  Due to injection administration, patient instructed to remain in clinic for 15 minutes  afterwards, and to report any adverse reaction to me immediately.

## 2018-09-20 ENCOUNTER — TELEPHONE (OUTPATIENT)
Dept: FAMILY MEDICINE | Facility: CLINIC | Age: 78
End: 2018-09-20

## 2018-09-20 ENCOUNTER — THERAPY VISIT (OUTPATIENT)
Dept: PHYSICAL THERAPY | Facility: CLINIC | Age: 78
End: 2018-09-20
Payer: MEDICARE

## 2018-09-20 DIAGNOSIS — S43.422A SPRAIN OF LEFT ROTATOR CUFF CAPSULE, INITIAL ENCOUNTER: ICD-10-CM

## 2018-09-20 PROCEDURE — 97161 PT EVAL LOW COMPLEX 20 MIN: CPT | Mod: GP | Performed by: PHYSICAL THERAPIST

## 2018-09-20 PROCEDURE — G8988 SELF CARE GOAL STATUS: HCPCS | Mod: GP | Performed by: PHYSICAL THERAPIST

## 2018-09-20 PROCEDURE — G8987 SELF CARE CURRENT STATUS: HCPCS | Mod: GP | Performed by: PHYSICAL THERAPIST

## 2018-09-20 PROCEDURE — 97110 THERAPEUTIC EXERCISES: CPT | Mod: GP | Performed by: PHYSICAL THERAPIST

## 2018-09-20 NOTE — LETTER
DEPARTMENT OF HEALTH AND HUMAN SERVICES  CENTERS FOR MEDICARE & MEDICAID SERVICES    PLAN/UPDATED PLAN OF PROGRESS FOR OUTPATIENT REHABILITATION    PATIENTS NAME:  Nilo Nelson   : 1940  PROVIDER NUMBER:    [id number]  HICN:  [id number]   PROVIDER NAME: [facility]  MEDICAL RECORD NUMBER: [id number]   START OF CARE DATE:  SOC Date: 18   TYPE:  PT  PRIMARY/TREATMENT DIAGNOSIS: (Pertinent Medical Diagnosis)  Sprain of left rotator cuff capsule, initial encounter  VISITS FROM START OF CARE:  Rxs Used: 1     [facility] Initial Evaluation  Subjective:  Nilo Nelson is a 78 year old male.    Patient s chief complaints: L shoulder pain.    Condition occurred due to not sure, but does note that he carried some boxes of tools.  He didn't feel pain at that time, but suspects that that may have contributed.  Date of Onset: about 18  Location of symptoms is lateral deltoid and upper arm, biceps/deltoid .  Symptoms other than pain include: denies symptoms other than pain   Quality of pain is aching and frequency is intermittent.    Pain dependence on time of day is: not dependent on time of day.   Pain rating is: 0-8/10.    Symptoms are exacerbated by: laying on it, reaching overhead, lifting.    Symptoms are relieved by:  Rest, position avoidance, placing pillow under arm.    Progression of symptoms is that symptoms are:  unchanged.  Imaging/Special tests include: none on L   Previous treatments include: none (did response to PT for L shoulder pain 2 years ago).   Patient reports that general health is: good.   Pertinent medical history includes:  None reported.    Medical allergies includes: none.   Surgical history includes: back surgery .  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  Shoulder:   PROM L PROM R AROM L AROM R MMT L MMT R   Flex   WNL Painful arc close to 90 5 5   Abd   WNL Painful 30-90 degrees 5 5   Full Can     4 with pain 5   Empty Can     5 5   IR   WNL WNL 5    ER   WNL WNL 4 with pain 5   Ext/IR   T8 T8       Scapulothoraic Rhythm: fairly symmetric pattern  Palpation: no pain with palpation  Special tests:   L R   Impingement     Neer's positive    Hawkin's-Jluis positive    Coracoid Impingement Trace pain    Internal impingement     Labral     Anterior Slide negative    Oconto's Negative (pain both thumb down/palm up)    Crank     Instability     Apprehension (anterior)     Relocation (anterior)     Anterior Load & Shift     Posterior Load & Shift     Posterior instability (with 90 degrees flex)     Multi-Directional Instability      Sulcus     Biceps      Speed's positive    Rotator Cuff Tear     Drop Arm     Belly Press     Lift off            PATIENTS NAME:  Nilo Nelson   : 1940    Assessment/Plan:    Patient is a 78 year old male with left side shoulder complaints.    Patient has the following significant findings with corresponding treatment plan.                Diagnosis 1:  L shoulder pain, likely rotator cuff tendinopathy    Pain -  hot/cold therapy and manual therapy  Decreased ROM/flexibility - manual therapy and therapeutic exercise  Decreased strength - therapeutic exercise and therapeutic activities  Decreased proprioception - neuro re-education and therapeutic activities  Decreased function - therapeutic activities  Impaired posture - neuro re-education    Therapy Evaluation Codes:   1) History comprised of:   Personal factors that impact the plan of  "care:      None.    Comorbidity factors that impact the plan of care are:      None.     Medications impacting care: None.  2) Examination of Body Systems comprised of:   Body structures and functions that impact the plan of care:      Shoulder.   Activity limitations that impact the plan of care are:      Dressing, Lifting, Laying down and reaching.  3) Clinical presentation characteristics are:   Stable/Uncomplicated.  4) Decision-Making    Low complexity using standardized patient assessment instrument and/or measureable assessment of functional outcome.  Cumulative Therapy Evaluation is: Low complexity.  Previous and current functional limitations:  (See Goal Flow Sheet for this information)    Short term and Long term goals: (See Goal Flow Sheet for this information)     Communication ability:  Patient appears to be able to clearly communicate and understand verbal and written communication and follow directions correctly.  Treatment Explanation - The following has been discussed with the patient:   RX ordered/plan of care  Anticipated outcomes  Possible risks and side effects  This patient would benefit from PT intervention to resume normal activities.   Rehab potential is good.    Frequency:  1 X week, once daily  Duration:  for 6 weeks  Discharge Plan:  Achieve all LTG.  Independent in home treatment program.  Reach maximal therapeutic benefit.          PATIENTS NAME:  Nilo Nelson   : 1940          Caregiver Signature/Credentials _____________________________ Date ________       Treating Provider: Bryan Gaspar DPT   I have reviewed and certified the need for these services and plan of treatment while under my care.        PHYSICIAN'S SIGNATURE:   _________________________________________  Date___________   Perez Carr MD    Certification period:  Beginning of Cert date period: 18 to  End of Cert period date: 18     Functional Level Progress Report: Please see attached \"Goal Flow " "sheet for Functional level.\"    ____X____ Continue Services or       ________ DC Services                Service dates: From  SOC Date: 09/20/18 date to present                         "

## 2018-09-20 NOTE — PROGRESS NOTES
[facility] Initial Evaluation  Subjective:  Nilo Nelson is a 78 year old male.    Patient s chief complaints: L shoulder pain.    Condition occurred due to not sure, but does note that he carried some boxes of tools.  He didn't feel pain at that time, but suspects that that may have contributed.  Date of Onset: about 7/20/18  Location of symptoms is lateral deltoid and upper arm, biceps/deltoid .  Symptoms other than pain include: denies symptoms other than pain   Quality of pain is aching and frequency is intermittent.    Pain dependence on time of day is: not dependent on time of day.   Pain rating is: 0-8/10.    Symptoms are exacerbated by: laying on it, reaching overhead, lifting.    Symptoms are relieved by:  Rest, position avoidance, placing pillow under arm.    Progression of symptoms is that symptoms are:  unchanged.  Imaging/Special tests include: none on L   Previous treatments include: none (did response to PT for L shoulder pain 2 years ago).   Patient reports that general health is: good.   Pertinent medical history includes:  None reported.    Medical allergies includes: none.   Surgical history includes: back surgery 1984.  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    Shoulder:   PROM L PROM R AROM L AROM R MMT L MMT R   Flex   WNL Painful arc close to 90 5 5   Abd   WNL Painful 30-90 degrees 5 5   Full Can     4 with pain 5   Empty Can     5 5   IR   WNL WNL 5    ER   WNL WNL 4 with pain 5   Ext/IR   T8 T8       Scapulothoraic Rhythm: fairly symmetric pattern    Palpation:  no pain with palpation    Special tests:   L R   Impingement     Neer's positive    Hawkin's-Jluis positive    Coracoid Impingement Trace pain    Internal impingement     Labral     Anterior Slide negative    Mcalister's Negative (pain both thumb down/palm up)    Crank     Instability     Apprehension (anterior)     Relocation (anterior)     Anterior Load & Shift     Posterior Load & Shift     Posterior instability (with 90 degrees flex)     Multi-Directional Instability      Sulcus     Biceps      Speed's positive    Rotator Cuff Tear     Drop Arm     Belly Press     Lift off          System    Physical Exam    General     ROS    Assessment/Plan:    Patient is a 78 year old male with left side shoulder complaints.    Patient has the following significant findings with corresponding treatment plan.                Diagnosis 1:  L shoulder pain, likely rotator cuff tendinopathy    Pain -  hot/cold therapy and manual therapy  Decreased ROM/flexibility - manual therapy and therapeutic exercise  Decreased strength - therapeutic exercise and therapeutic activities  Decreased proprioception - neuro re-education and therapeutic activities  Decreased function - therapeutic activities  Impaired posture - neuro re-education    Therapy Evaluation Codes:   1) History comprised of:   Personal factors that impact the plan of care:      None.    Comorbidity factors that impact the plan of care are:      None.     Medications impacting care: None.  2) Examination of Body Systems comprised of:   Body structures and functions that impact the plan of care:      Shoulder.   Activity limitations that impact the plan of care are:      Dressing, Lifting, Laying down and reaching.  3) Clinical presentation characteristics are:   Stable/Uncomplicated.  4) Decision-Making    Low complexity using standardized patient assessment instrument and/or measureable assessment of functional outcome.  Cumulative Therapy Evaluation is: Low  complexity.    Previous and current functional limitations:  (See Goal Flow Sheet for this information)    Short term and Long term goals: (See Goal Flow Sheet for this information)     Communication ability:  Patient appears to be able to clearly communicate and understand verbal and written communication and follow directions correctly.  Treatment Explanation - The following has been discussed with the patient:   RX ordered/plan of care  Anticipated outcomes  Possible risks and side effects  This patient would benefit from PT intervention to resume normal activities.   Rehab potential is good.    Frequency:  1 X week, once daily  Duration:  for 6 weeks  Discharge Plan:  Achieve all LTG.  Independent in home treatment program.  Reach maximal therapeutic benefit.    Please refer to the daily flowsheet for treatment today, total treatment time and time spent performing 1:1 timed codes.

## 2018-09-20 NOTE — TELEPHONE ENCOUNTER
Spoke with patient in regards to pain medication request. Per Dr. Carr's note, patient to use OTC NSAID to help with pain. Discussed with patient usage of Ibuprofen 600 mg TID or Naproxen Sodium 2 tablets BID to help with the pain and to call back if pain is not getting any better with medication and to follow up as needed.

## 2018-09-20 NOTE — TELEPHONE ENCOUNTER
Reason for Call:  Medication or medication refill:    Do you use a [facility]?  Name of the pharmacy and phone number for the current request:       [facility] [address] [place], [place] - [address]    Name of the medication requested: some sort of pain medication    Other request: call once addressed  This was to be sent over yesterday after his appt.    Can we leave a detailed message on this number? YES    Phone number patient can be reached at: Cell number on file:    Telephone Information:   Mobile [phone]       Best Time: anytime    Call taken on 9/20/2018 at 12:57 PM by Lucas Jerez

## 2018-10-02 ENCOUNTER — THERAPY VISIT (OUTPATIENT)
Dept: PHYSICAL THERAPY | Facility: CLINIC | Age: 78
End: 2018-10-02
Payer: MEDICARE

## 2018-10-02 DIAGNOSIS — S43.422A SPRAIN OF LEFT ROTATOR CUFF CAPSULE, INITIAL ENCOUNTER: ICD-10-CM

## 2018-10-02 PROCEDURE — 97112 NEUROMUSCULAR REEDUCATION: CPT | Mod: GP | Performed by: PHYSICAL THERAPIST

## 2018-10-02 PROCEDURE — 97110 THERAPEUTIC EXERCISES: CPT | Mod: GP | Performed by: PHYSICAL THERAPIST

## 2018-10-09 ENCOUNTER — OFFICE VISIT (OUTPATIENT)
Dept: FAMILY MEDICINE | Facility: CLINIC | Age: 78
End: 2018-10-09
Payer: COMMERCIAL

## 2018-10-09 ENCOUNTER — THERAPY VISIT (OUTPATIENT)
Dept: PHYSICAL THERAPY | Facility: CLINIC | Age: 78
End: 2018-10-09
Payer: MEDICARE

## 2018-10-09 VITALS
SYSTOLIC BLOOD PRESSURE: 112 MMHG | WEIGHT: 162 LBS | HEIGHT: 67 IN | BODY MASS INDEX: 25.43 KG/M2 | HEART RATE: 69 BPM | OXYGEN SATURATION: 98 % | DIASTOLIC BLOOD PRESSURE: 72 MMHG | TEMPERATURE: 98.4 F

## 2018-10-09 DIAGNOSIS — S43.422A SPRAIN OF LEFT ROTATOR CUFF CAPSULE, INITIAL ENCOUNTER: ICD-10-CM

## 2018-10-09 DIAGNOSIS — E78.5 HYPERLIPIDEMIA, UNSPECIFIED HYPERLIPIDEMIA TYPE: ICD-10-CM

## 2018-10-09 DIAGNOSIS — E03.4 HYPOTHYROIDISM DUE TO ACQUIRED ATROPHY OF THYROID: ICD-10-CM

## 2018-10-09 DIAGNOSIS — R53.83 FATIGUE, UNSPECIFIED TYPE: ICD-10-CM

## 2018-10-09 DIAGNOSIS — N40.0 PROSTATISM: ICD-10-CM

## 2018-10-09 DIAGNOSIS — Z12.11 COLON CANCER SCREENING: ICD-10-CM

## 2018-10-09 DIAGNOSIS — E78.5 HYPERLIPIDEMIA LDL GOAL <130: ICD-10-CM

## 2018-10-09 DIAGNOSIS — Z00.00 ENCOUNTER FOR ROUTINE ADULT HEALTH EXAMINATION WITHOUT ABNORMAL FINDINGS: Primary | ICD-10-CM

## 2018-10-09 DIAGNOSIS — L57.0 ACTINIC KERATOSIS: ICD-10-CM

## 2018-10-09 DIAGNOSIS — I10 BENIGN ESSENTIAL HYPERTENSION: ICD-10-CM

## 2018-10-09 LAB
ALBUMIN SERPL-MCNC: 3.8 G/DL (ref 3.4–5)
ALBUMIN UR-MCNC: NEGATIVE MG/DL
ALP SERPL-CCNC: 64 U/L (ref 40–150)
ALT SERPL W P-5'-P-CCNC: 26 U/L (ref 0–70)
ANION GAP SERPL CALCULATED.3IONS-SCNC: 6 MMOL/L (ref 3–14)
APPEARANCE UR: CLEAR
AST SERPL W P-5'-P-CCNC: 23 U/L (ref 0–45)
BILIRUB SERPL-MCNC: 0.8 MG/DL (ref 0.2–1.3)
BILIRUB UR QL STRIP: NEGATIVE
BUN SERPL-MCNC: 11 MG/DL (ref 7–30)
CALCIUM SERPL-MCNC: 8.8 MG/DL (ref 8.5–10.1)
CHLORIDE SERPL-SCNC: 103 MMOL/L (ref 94–109)
CHOLEST SERPL-MCNC: 186 MG/DL
CO2 SERPL-SCNC: 26 MMOL/L (ref 20–32)
COLOR UR AUTO: YELLOW
CREAT SERPL-MCNC: 0.91 MG/DL (ref 0.66–1.25)
ERYTHROCYTE [DISTWIDTH] IN BLOOD BY AUTOMATED COUNT: 12.7 % (ref 10–15)
GFR SERPL CREATININE-BSD FRML MDRD: 81 ML/MIN/1.7M2
GLUCOSE SERPL-MCNC: 87 MG/DL (ref 70–99)
GLUCOSE UR STRIP-MCNC: NEGATIVE MG/DL
HCT VFR BLD AUTO: 45.2 % (ref 40–53)
HDLC SERPL-MCNC: 71 MG/DL
HGB BLD-MCNC: 15.5 G/DL (ref 13.3–17.7)
HGB UR QL STRIP: NEGATIVE
KETONES UR STRIP-MCNC: NEGATIVE MG/DL
LDLC SERPL CALC-MCNC: 90 MG/DL
LEUKOCYTE ESTERASE UR QL STRIP: NEGATIVE
MCH RBC QN AUTO: 33.5 PG (ref 26.5–33)
MCHC RBC AUTO-ENTMCNC: 34.3 G/DL (ref 31.5–36.5)
MCV RBC AUTO: 98 FL (ref 78–100)
NITRATE UR QL: NEGATIVE
NONHDLC SERPL-MCNC: 115 MG/DL
PH UR STRIP: 7.5 PH (ref 5–7)
PLATELET # BLD AUTO: 264 10E9/L (ref 150–450)
POTASSIUM SERPL-SCNC: 4.3 MMOL/L (ref 3.4–5.3)
PROT SERPL-MCNC: 7.5 G/DL (ref 6.8–8.8)
RBC # BLD AUTO: 4.63 10E12/L (ref 4.4–5.9)
SODIUM SERPL-SCNC: 135 MMOL/L (ref 133–144)
SOURCE: ABNORMAL
SP GR UR STRIP: 1.02 (ref 1–1.03)
TRIGL SERPL-MCNC: 125 MG/DL
TSH SERPL DL<=0.005 MIU/L-ACNC: 1.63 MU/L (ref 0.4–4)
UROBILINOGEN UR STRIP-ACNC: 0.2 EU/DL (ref 0.2–1)
WBC # BLD AUTO: 5 10E9/L (ref 4–11)

## 2018-10-09 PROCEDURE — 97140 MANUAL THERAPY 1/> REGIONS: CPT | Mod: GP | Performed by: PHYSICAL THERAPIST

## 2018-10-09 PROCEDURE — 99397 PER PM REEVAL EST PAT 65+ YR: CPT | Performed by: INTERNAL MEDICINE

## 2018-10-09 PROCEDURE — 81003 URINALYSIS AUTO W/O SCOPE: CPT | Performed by: INTERNAL MEDICINE

## 2018-10-09 PROCEDURE — 85027 COMPLETE CBC AUTOMATED: CPT | Performed by: INTERNAL MEDICINE

## 2018-10-09 PROCEDURE — 97112 NEUROMUSCULAR REEDUCATION: CPT | Mod: GP | Performed by: PHYSICAL THERAPIST

## 2018-10-09 PROCEDURE — 36415 COLL VENOUS BLD VENIPUNCTURE: CPT | Performed by: INTERNAL MEDICINE

## 2018-10-09 PROCEDURE — 84443 ASSAY THYROID STIM HORMONE: CPT | Performed by: INTERNAL MEDICINE

## 2018-10-09 PROCEDURE — 80061 LIPID PANEL: CPT | Performed by: INTERNAL MEDICINE

## 2018-10-09 PROCEDURE — 97110 THERAPEUTIC EXERCISES: CPT | Mod: GP | Performed by: PHYSICAL THERAPIST

## 2018-10-09 PROCEDURE — 80053 COMPREHEN METABOLIC PANEL: CPT | Performed by: INTERNAL MEDICINE

## 2018-10-09 RX ORDER — SIMVASTATIN 10 MG
TABLET ORAL
Qty: 30 TABLET | Refills: 3 | Status: SHIPPED | OUTPATIENT
Start: 2018-10-09 | End: 2018-10-09

## 2018-10-09 NOTE — PROGRESS NOTES
SUBJECTIVE:   Nilo Nelson is a 78 year old male who presents for Preventive Visit.    Are you in the first 12 months of your Medicare Part B coverage?  No    Healthy Habits:    Do you get at least three servings of calcium containing foods daily (dairy, green leafy vegetables, etc.)? Yes and takes calcium supplement    Amount of exercise or daily activities, outside of work: 7 day(s) per week walking 10,000 steps    Problems taking medications regularly No    Medication side effects: No    Have you had an eye exam in the past two years? yes    Do you see a dentist twice per year? annually     Do you have sleep apnea, excessive snoring or daytime drowsiness?no      Ability to successfully perform activities of daily living: Yes, no assistance needed    Home safety:  none identified     Hearing impairment: Yes, Difficulty following a conversation in a noisy restaurant or crowded room.    Feel that people are mumbling or not speaking clearly.    Need to ask people to speak up or repeat themselves.    Fall risk:  Fallen 2 or more times in the past year?: No  Any fall with injury in the past year?: No        COGNITIVE SCREEN  1) Repeat 3 items (Leader, Season, Table)    2) Clock draw: NORMAL  3) 3 item recall: Recalls 2 objects   Results: NORMAL clock, 2 items recalled: COGNITIVE IMPAIRMENT LESS LIKELY    Mini-CogTM Copyright S Kavon. Licensed by the author for use in [facility]; reprinted with permission (brianne@.[email or web address]). All rights reserved.      HPI  Patient reports that he has been working on his rotated cuff and he says that it is a  little bit achy. He believes that it was due to overworking it. Nilo also, reports that he had a blister on one of his ear that has resolved itself by falling off. Patient is also taking his medication regularly. He reports that he takes ibuprofen 200 mg three times a day to help with the discomfort in his shoulder. He has very little change in his vision but he  recently saw the opthalmologist. He use to have a regular exercise routine by walking, however, since the weather changed he hasn't been walking as much.    Reviewed and updated as needed this visit by clinical staff         Reviewed and updated as needed this visit by Provider        Social History   Substance Use Topics     Smoking status: Former Smoker     Smokeless tobacco: Never Used      Comment: per encounter 3/9/07     Alcohol use 0.0 oz/week     0 Standard drinks or equivalent per week      Comment: 1 day       If you drink alcohol do you typically have >3 drinks per day or >7 drinks per week? No                        Today's PHQ-2 Score:   PHQ-2 ( 1999 Pf[employer]) 10/10/2017 10/4/2016   Q1: Little interest or pleasure in doing things 0 0   Q2: Feeling down, depressed or hopeless 0 0   PHQ-2 Score 0 0       Do you feel safe in your environment - YES    Do you have a Health Care Directive?: Yes: Advance Directive has been received and scanned.    Current providers sharing in care for this patient include:   Patient Care Team:  Perez Carr MD as PCP - General (Internal Medicine)    The following health maintenance items are reviewed in Epic and correct as of today:  Health Maintenance   Topic Date Due     FALL RISK ASSESSMENT  10/10/2018     PHQ-2 Q1 YR  10/10/2018     TETANUS IMMUNIZATION (SYSTEM ASSIGNED)  09/14/2022     LIPID SCREEN Q5 YR MALE (SYSTEM ASSIGNED)  10/05/2022     ADVANCE DIRECTIVE PLANNING Q5 YRS  10/05/2022     PNEUMOCOCCAL  Completed     INFLUENZA VACCINE  Completed     Patient Active Problem List   Diagnosis     Benign essential hypertension     Hyperlipidemia LDL goal <130     Prostatism     Hypothyroidism due to acquired atrophy of thyroid     Advanced directives, counseling/discussion     Actinic keratosis     Sprain of left rotator cuff capsule, initial encounter     Past Surgical History:   Procedure Laterality Date     BACK SURGERY      LUMBAR DISC     COLONOSCOPY  10/18/2012     Procedure: COLONOSCOPY;  COLONOSCOPY ;  Surgeon: Zain Lockhart MD;  Location: [facility]     LAPAROSCOPIC HERNIORRHAPHY INGUINAL BILATERAL  10/18/2011    Procedure:LAPAROSCOPIC HERNIORRHAPHY INGUINAL BILATERAL; Laparoscopic Bilateral Inguinal Hernia Repair with Mesh; Surgeon:MITESH GREENFIELD; Location:[facility]       Social History   Substance Use Topics     Smoking status: Former Smoker     Smokeless tobacco: Never Used      Comment: per encounter 3/9/07     Alcohol use 0.0 oz/week     0 Standard drinks or equivalent per week      Comment: 1 day     No family history on file.      Current Outpatient Prescriptions   Medication Sig Dispense Refill     aspirin 81 MG tablet Take 1 tablet by mouth daily.       GLUCOSAMINE CHONDROITIN COMPLX PO Take 1 tablet by mouth daily.       levothyroxine (SYNTHROID/LEVOTHROID) 75 MCG tablet TAKE 1 TABLET(75 MCG) BY MOUTH DAILY 90 tablet 0     Loratadine (CLARITIN PO) Take  by mouth as needed.       Multiple Vitamins-Minerals (CENTRUM SILVER) per tablet Take 1 tablet by mouth daily.       Psyllium (METAMUCIL PO) Take 2 tsp by mouth daily.       simvastatin (ZOCOR) 10 MG tablet TAKE 1 TABLET(10 MG) BY MOUTH AT BEDTIME 30 tablet 0     tamsulosin (FLOMAX) 0.4 MG capsule TAKE 1 CAPSULE(0.4 MG) BY MOUTH TWICE DAILY 180 capsule 3     VITAMIN D, CHOLECALCIFEROL, PO Take 600 tablets by mouth daily           History   Wife is concern about early cognitive disfucntion    ROS:  Constitutional, HEENT, cardiovascular, pulmonary, gi and gu systems are negative, except as otherwise noted.        This document serves as a record of the services and decisions personally performed and made by Perez Carr MD. It was created on his behalf by Antionette Gamble, a trained medical scribe. The creation of this document is based on the observations of the medical scribe, and the provider's statements to the medical scribe.  Antionette Gamble October 9, 2018 10:14 AM      OBJECTIVE:   There were no vitals taken for this  "visit. Estimated body mass index is 25.84 kg/(m^2) as calculated from the following:    Height as of 9/19/18: 5' 7\" (1.702 m).    Weight as of 9/19/18: 165 lb (74.8 kg).  EXAM: /72 (BP Location: Left arm, Patient Position: Sitting, Cuff Size: Adult Regular)  Pulse 69  Temp 98.4  F (36.9  C) (Oral)  Ht 5' 7\" (1.702 m)  Wt 162 lb (73.5 kg)  SpO2 98%  BMI 25.37 kg/m2    Exam  HEENT has small ucler on his ear because of the recent liquid Nitrogen application. His faces is otherwise clear. Tympanic membranes are normal nose and throat were clear right ear ulcer from recent liquid nitrogen  Neck was supple without adenopathy thyromegaly or masses  Chest clear to auscultation and percussion  Cardiovascular S1 and S2 are physiologic without murmurs or gallop rhythm was regular  Abdomen bowel sounds are normal there are no palpable masses organomegaly or tenderness    testes were descended without masses there is no sign of inguinal herniorrhaphy  Rectal anus was normal prostate was 2 to 4+ and smooth  Extremities were nontender without any edema  Deep tendon reflexes were intact  Skin was clear recheck left temple hairline  Pulses were two over 4+ bilaterally symmetrical throughout        Diagnostic Test Results:  No results found for this or any previous visit (from the past 24 hour(s)).    ASSESSMENT / PLAN:   1. Encounter for routine adult health examination without abnormal findings  - UA reflex to Microscopic and Culture  - CBC with platelets  - Comprehensive metabolic panel  - Lipid panel reflex to direct LDL Fasting  - TSH with free T4 reflex    2. Benign essential hypertension  Blood pressur is under good control because of his current treatment   - CBC with platelets  - Comprehensive metabolic panel    3. Hyperlipidemia LDL goal <130  Follow up labs   - Lipid panel reflex to direct LDL Fasting    4. Prostatism  Symptom relative well controlled     5. Hypothyroidism due to acquired atrophy of " "thyroid  Follow up labs to assest medication changes   - TSH with free T4 reflex    6. Actinic keratosis    7. Sprain of left rotator cuff capsule, initial encounter  Therapy seems to be working thus far     8. Fatigue, unspecified type    9. Hyperlipidemia, unspecified hyperlipidemia type  - simvastatin (ZOCOR) 10 MG tablet; TAKE 1 TABLET(10 MG) BY MOUTH AT BEDTIME  Dispense: 30 tablet; Refill: 3    End of Life Planning:  Patient currently has an advanced directive:     COUNSELING:  Reviewed preventive health counseling, as reflected in patient instructions       Regular exercise       Healthy diet/nutrition       Vision screening       Colon cancer screening       Prostate cancer screening    BP Readings from Last 1 Encounters:   09/19/18 132/80     Estimated body mass index is 25.84 kg/(m^2) as calculated from the following:    Height as of 9/19/18: 5' 7\" (1.702 m).    Weight as of 9/19/18: 165 lb (74.8 kg).           reports that he has quit smoking. He has never used smokeless tobacco.      Appropriate preventive services were discussed with this patient, including applicable screening as appropriate for cardiovascular disease, diabetes, osteopenia/osteoporosis, and glaucoma.  As appropriate for age/gender, discussed screening for colorectal cancer, prostate cancer, breast cancer, and cervical cancer. Checklist reviewing preventive services available has been given to the patient.    Reviewed patients plan of care and provided an AVS. The Basic Care Plan (routine screening as documented in Health Maintenance) for Nilo meets the Care Plan requirement. This Care Plan has been established and reviewed with the Patient.    Counseling Resources:  ATP IV Guidelines  Pooled Cohorts Equation Calculator  Breast Cancer Risk Calculator  FRAX Risk Assessment  ICSI Preventive Guidelines  Dietary Guidelines for Americans, 2010  USDA's MyPlate  ASA Prophylaxis  Lung CA Screening    The information in this document, created " by the medical scribe for me, accurately reflects the services I personally performed and the decisions made by me. I have reviewed and approved this document for accuracy prior to leaving the patient care area.  Perez Carr MD  3:11 PM October 9, 2018      Perez Carr MD  [facility]

## 2018-10-09 NOTE — MR AVS SNAPSHOT
After Visit Summary   10/9/2018    Nilo Nelson    MRN: [id number]           Patient Information     Date Of Birth          1940        Visit Information        Provider Department      10/9/2018 9:30 AM Perez Carr MD [facility]        Today's Diagnoses     Encounter for routine adult health examination without abnormal findings    -  1    Benign essential hypertension        Hyperlipidemia LDL goal <130        Prostatism        Hypothyroidism due to acquired atrophy of thyroid        Actinic keratosis        Sprain of left rotator cuff capsule, initial encounter        Fatigue, unspecified type        Hyperlipidemia, unspecified hyperlipidemia type        Colon cancer screening          Care Instructions      Preventive Health Recommendations:       Male Ages 65 and over    Yearly exam:             See your health care provider every year in order to  o   Review health changes.   o   Discuss preventive care.    o   Review your medicines if your doctor has prescribed any.    Talk with your health care provider about whether you should have a test to screen for prostate cancer (PSA).    Every 3 years, have a diabetes test (fasting glucose). If you are at risk for diabetes, you should have this test more often.    Every 5 years, have a cholesterol test. Have this test more often if you are at risk for high cholesterol or heart disease.     Every 10 years, have a colonoscopy. Or, have a yearly FIT test (stool test). These exams will check for colon cancer.    Talk to with your health care provider about screening for Abdominal Aortic Aneurysm if you have a family history of AAA or have a history of smoking.  Shots:     Get a flu shot each year.     Get a tetanus shot every 10 years.     Talk to your doctor about your pneumonia vaccines. There are now two you should receive - Pneumovax (PPSV 23) and Prevnar (PCV 13).    Talk to your pharmacist about a shingles vaccine.     Talk to  your doctor about the hepatitis B vaccine.  Nutrition:     Eat at least 5 servings of fruits and vegetables each day.     Eat whole-grain bread, whole-wheat pasta and brown rice instead of white grains and rice.     Get adequate Calcium and Vitamin D.   Lifestyle    Exercise for at least 150 minutes a week (30 minutes a day, 5 days a week). This will help you control your weight and prevent disease.     Limit alcohol to one drink per day.     No smoking.     Wear sunscreen to prevent skin cancer.     See your dentist every six months for an exam and cleaning.     See your eye doctor every 1 to 2 years to screen for conditions such as glaucoma, macular degeneration and cataracts.          Follow-ups after your visit        Additional Services     GASTROENTEROLOGY ADULT REF PROCEDURE ONLY Jonathan Hare [phone]; Dr. CHAYO Barba       Last Lab Result: Creatinine (mg/dL)       Date                     Value                 10/09/2018               0.91             ----------  Body mass index is 25.37 kg/(m^2).      Patient will be contacted to schedule procedure.     Please be aware that coverage of these services is subject to the terms and limitations of your health insurance plan.  Call member services at your health plan with any benefit or coverage questions.  Any procedures must be performed at a [facility] facility OR coordinated by your clinic's referral office.    Please bring the following with you to your appointment:    (1) Any X-Rays, CTs or MRIs which have been performed.  Contact the facility where they were done to arrange for  prior to your scheduled appointment.    (2) List of current medications   (3) This referral request   (4) Any documents/labs given to you for this referral                  Your next 10 appointments already scheduled     Oct 16, 2018 12:00 PM CDT   DAYANARA Extremity with Bryan Gaspar PT   [facility] (DAYANARA Gutiérrez  )    [address]  [place] [address] [phone]            Oct 23, 2018 11:20 AM CDT   DAYANARA Extremity with Bryan Gaspar PT   [place] (DAYANARA Gutiérrez  )    [address]  [place] [id number] [phone]              Who to contact     If you have questions or need follow up information about today's clinic visit or your schedule please contact [facility] directly at [phone].  Normal or non-critical lab and imaging results will be communicated to you by MyChart, letter or phone within 4 business days after the clinic has received the results. If you do not hear from us within 7 days, please contact the clinic through MyChart or phone. If you have a critical or abnormal lab result, we will notify you by phone as soon as possible.  Submit refill requests through [employer] or call your pharmacy and they will forward the refill request to us. Please allow 3 business days for your refill to be completed.          Additional Information About Your Visit        Care EveryWhere ID     This is your Care EveryWhere ID. This could be used by other organizations to access your [facility] medical records  [id number]        Your Vitals Were     Pulse Temperature Height Pulse Oximetry BMI (Body Mass Index)       69 98.4  F (36.9  C) (Oral) 5' 7\" (1.702 m) 98% 25.37 kg/m2        Blood Pressure from Last 3 Encounters:   10/09/18 112/72   09/19/18 132/80   08/01/18 106/67    Weight from Last 3 Encounters:   10/09/18 162 lb (73.5 kg)   09/19/18 165 lb (74.8 kg)   08/01/18 164 lb (74.4 kg)              We Performed the Following     CBC with platelets     Comprehensive metabolic panel     GASTROENTEROLOGY ADULT REF PROCEDURE ONLY Jonathan Hare [phone]; Dr. CHAYO Barba     Lipid panel reflex to direct LDL Fasting     TSH with free T4 reflex     UA reflex to Microscopic and Culture          Where to get your medicines      These " medications were sent to [id number] - [place], [place] - [address] [place] [address]  [address] [place] [address] [address]    Hours:  24-hours Phone:  [phone]     simvastatin 10 MG tablet          Primary Care Provider Office Phone # Fax #    Perez Carr [phone] [address]  [place] [address]        Equal Access to Services     [facility]IL[facility] Center[facility]EN[facility] : Hadii aad ku hadasho Soomaali, waaxda luqadaha, qaybta kaalmada adeegyada, waxay idiin hayaan adeeg kharasilvestre garland . So [facility] [phone].    ATENCIÓN: Si habla español, tiene a martinez disposición servicios gratuitos de asistencia lingüística. [facility] [phone].    We comply with applicable federal civil rights laws and [place] laws. We do not discriminate on the basis of race, color, national origin, age, disability, sex, sexual orientation, or gender identity.            Thank you!     Thank you for choosing [facility]  for your care. Our goal is always to provide you with excellent care. Hearing back from our patients is one way we can continue to improve our services. Please take a few minutes to complete the written survey that you may receive in the mail after your visit with us. Thank you!             Your Updated Medication List - Protect others around you: Learn how to safely use, store and throw away your medicines at www.disposemymeds.org.          This list is accurate as of 10/9/18 11:59 PM.  Always use your most recent med list.                   Brand Name Dispense Instructions for use Diagnosis    aspirin 81 MG tablet      Take 1 tablet by mouth daily.        CENTRUM SILVER per tablet      Take 1 tablet by mouth daily.        CLARITIN PO      Take  by mouth as needed.        GLUCOSAMINE CHONDROITIN COMPLX PO      Take 1 tablet by mouth daily.        levothyroxine 75 MCG tablet    SYNTHROID/LEVOTHROID    90 tablet    TAKE 1 TABLET(75 MCG) BY MOUTH DAILY    Hypothyroidism  due to acquired atrophy of thyroid       METAMUCIL PO      Take 2 tsp by mouth daily.        simvastatin 10 MG tablet    ZOCOR    30 tablet    TAKE 1 TABLET(10 MG) BY MOUTH AT BEDTIME    Hyperlipidemia, unspecified hyperlipidemia type       tamsulosin 0.4 MG capsule    FLOMAX    180 capsule    TAKE 1 CAPSULE(0.4 MG) BY MOUTH TWICE DAILY    Prostatism       VITAMIN D (CHOLECALCIFEROL) PO      Take 600 tablets by mouth daily

## 2018-10-10 RX ORDER — SIMVASTATIN 10 MG
TABLET ORAL
Qty: 90 TABLET | Refills: 3 | Status: SHIPPED | OUTPATIENT
Start: 2018-10-10

## 2018-10-16 ENCOUNTER — THERAPY VISIT (OUTPATIENT)
Dept: PHYSICAL THERAPY | Facility: CLINIC | Age: 78
End: 2018-10-16
Payer: MEDICARE

## 2018-10-16 DIAGNOSIS — S43.422A SPRAIN OF LEFT ROTATOR CUFF CAPSULE, INITIAL ENCOUNTER: ICD-10-CM

## 2018-10-16 PROCEDURE — G8988 SELF CARE GOAL STATUS: HCPCS | Mod: GP | Performed by: PHYSICAL THERAPIST

## 2018-10-16 PROCEDURE — 97140 MANUAL THERAPY 1/> REGIONS: CPT | Mod: GP | Performed by: PHYSICAL THERAPIST

## 2018-10-16 PROCEDURE — G8987 SELF CARE CURRENT STATUS: HCPCS | Mod: GP | Performed by: PHYSICAL THERAPIST

## 2018-10-16 PROCEDURE — 97112 NEUROMUSCULAR REEDUCATION: CPT | Mod: GP | Performed by: PHYSICAL THERAPIST

## 2018-10-16 PROCEDURE — 97110 THERAPEUTIC EXERCISES: CPT | Mod: GP | Performed by: PHYSICAL THERAPIST

## 2018-10-16 NOTE — PROGRESS NOTES
Subjective:  HPI                    Objective:  System    Physical Exam    General     ROS    Assessment/Plan:    PROGRESS  REPORT    Progress reporting period is from 9/20/18 to 10/16/18 (4 visits).       SUBJECTIVE  Subjective changes noted by patient:  Patient reports that his symptoms are about the same.  He is without pain at times, but it can vary and go up to 7/10 at times as well.  Pain is over the distal/lateral deltoid, with raising his arm forward or out to the side and with lifting.     Current Pain level:  (0-7/10).     Initial Pain level: 8/10.   Changes in function:  Yes (See Goal flowsheet attached for changes in current functional level)  Adverse reaction to treatment or activity: None    OBJECTIVE  Changes noted in objective findings:  Yes,   Objective:     Shoulder AROM: no pain with flexion, abduciton with pain 0-45 degrees (on initiating motion), IR/ER are without pain.  Ext/IR without pain.       With palpation there is what feels like a small soft mass at the distal deltoid (anterior/mid deltoid) at the insertion of the deltoid on to the humerus.      Did not re test strength yet.    He is working on rotator cuff, scapular, biceps and deloid strength exercises in PT.     ASSESSMENT/PLAN  Updated problem list and treatment plan: Diagnosis 1:  L shoulder rotator cuff tendinopathy    Pain -  hot/cold therapy, US and manual therapy  Decreased ROM/flexibility - manual therapy and therapeutic exercise  Decreased strength - therapeutic exercise and therapeutic activities  Decreased proprioception - neuro re-education and therapeutic activities  Decreased function - therapeutic activities  STG/LTGs have been met or progress has been made towards goals:  Yes (See Goal flow sheet completed today.)  Assessment of Progress: The patient's condition is improving.  Self Management Plans:  Patient has been instructed in a home treatment program.  I have re-evaluated this patient and find that the nature,  scope, duration and intensity of the therapy is appropriate for the medical condition of the patient.  Nilo continues to require the following intervention to meet STG and LTG's:  PT    Recommendations:  This patient would benefit from continued therapy.     Frequency:  1 X week, once daily  Duration:  for 1 weeks    Will then travel to [place].  He will also meet with his referring MD prior to traveling south.        Please refer to the daily flowsheet for treatment today, total treatment time and time spent performing 1:1 timed codes.

## 2018-10-22 ENCOUNTER — OFFICE VISIT (OUTPATIENT)
Dept: FAMILY MEDICINE | Facility: CLINIC | Age: 78
End: 2018-10-22
Payer: COMMERCIAL

## 2018-10-22 VITALS
SYSTOLIC BLOOD PRESSURE: 113 MMHG | HEART RATE: 73 BPM | BODY MASS INDEX: 25.43 KG/M2 | DIASTOLIC BLOOD PRESSURE: 71 MMHG | HEIGHT: 67 IN | OXYGEN SATURATION: 97 % | WEIGHT: 162 LBS | TEMPERATURE: 97.6 F

## 2018-10-22 DIAGNOSIS — E78.5 HYPERLIPIDEMIA LDL GOAL <130: ICD-10-CM

## 2018-10-22 DIAGNOSIS — E03.4 HYPOTHYROIDISM DUE TO ACQUIRED ATROPHY OF THYROID: ICD-10-CM

## 2018-10-22 DIAGNOSIS — I10 BENIGN ESSENTIAL HYPERTENSION: Primary | ICD-10-CM

## 2018-10-22 DIAGNOSIS — L57.0 ACTINIC KERATOSIS: ICD-10-CM

## 2018-10-22 DIAGNOSIS — S43.422A SPRAIN OF LEFT ROTATOR CUFF CAPSULE, INITIAL ENCOUNTER: ICD-10-CM

## 2018-10-22 PROCEDURE — 99214 OFFICE O/P EST MOD 30 MIN: CPT | Performed by: INTERNAL MEDICINE

## 2018-10-22 NOTE — PROGRESS NOTES
SUBJECTIVE:   Nilo Nelson is a 78 year old male who presents to clinic today for the following health issues:    Hypertension Recheck  Nilo is doing well today in clinic with a BP reading of 113/71.     Knot in Left Bicep  Nilo complains of a pain with elbow bending actions, and has been doing proper stretches. He suspects that the lifting he has been doing may be the cause. He notes that it has been gradually getting better over a tincture of time.      Right Ear Scarring  Nilo reports feeling a built up scarring on his right ear.    Problem list and histories reviewed & adjusted, as indicated.  Additional history: as documented    Patient Active Problem List   Diagnosis     Benign essential hypertension     Hyperlipidemia LDL goal <130     Prostatism     Hypothyroidism due to acquired atrophy of thyroid     Advanced directives, counseling/discussion     Actinic keratosis     Sprain of left rotator cuff capsule, initial encounter     Past Surgical History:   Procedure Laterality Date     BACK SURGERY      LUMBAR DISC     COLONOSCOPY  10/18/2012    Procedure: COLONOSCOPY;  COLONOSCOPY ;  Surgeon: Zain Lockhart MD;  Location:  GI     LAPAROSCOPIC HERNIORRHAPHY INGUINAL BILATERAL  10/18/2011    Procedure:LAPAROSCOPIC HERNIORRHAPHY INGUINAL BILATERAL; Laparoscopic Bilateral Inguinal Hernia Repair with Mesh; Surgeon:MITESH GREENFIELD; Location:[facility]       Social History   Substance Use Topics     Smoking status: Former Smoker     Smokeless tobacco: Never Used      Comment: per encounter 3/9/07     Alcohol use 0.0 oz/week     0 Standard drinks or equivalent per week      Comment: 1 day     History reviewed. No pertinent family history.      Labs reviewed in EPIC    Reviewed and updated as needed this visit by clinical staff       Reviewed and updated as needed this visit by Provider         ROS:  CONSTITUTIONAL: NEGATIVE for fever, chills, change in weight  INTEGUMENTARY/SKIN: NEGATIVE for worrisome  "rashes, moles or lesions  EYES: NEGATIVE for vision changes or irritation  ENT/MOUTH: NEGATIVE for ear, mouth and throat problems  RESP: NEGATIVE for significant cough or SOB  BREAST: NEGATIVE for masses, tenderness or discharge  CV: NEGATIVE for chest pain, palpitations or peripheral edema  GI: NEGATIVE for nausea, abdominal pain, heartburn, or change in bowel habits  : NEGATIVE for frequency, dysuria, or hematuria  MUSCULOSKELETAL: NEGATIVE for significant arthralgias or myalgia  NEURO: NEGATIVE for weakness, dizziness or paresthesias  ENDOCRINE: NEGATIVE for temperature intolerance, skin/hair changes  HEME: NEGATIVE for bleeding problems  PSYCHIATRIC: NEGATIVE for changes in mood or affect    This document serves as a record of the services and decisions personally performed and made by Perez Carr MD. It was created on his behalf by Suraj Thayer, a trained medical scribe. The creation of this document is based the provider's statements to the medical scribe.  Scribe Suraj Thayer 2:29 PM, October 22, 2018    OBJECTIVE:   /71 (BP Location: Left arm, Patient Position: Chair, Cuff Size: Adult Large)  Pulse 73  Temp 97.6  F (36.4  C) (Oral)  Ht 1.702 m (5' 7\")  Wt 73.5 kg (162 lb)  SpO2 97%  BMI 25.37 kg/m2  Body mass index is 25.37 kg/(m^2).  Neck was supple without adenopathy or thyromegaly his carotids were normal without bruits  Chest clear to auscultation and percussion  Cardiovascular S1 and S2 are physiologic without murmurs or gallops  Abdomen bowel sounds were normal.  There is no palpable mass or organomegaly  Extremities nontender without any edema  Pulses pedal pulses are as described otherwise his pulses are bilaterally symmetrical throughout without bruits  Skin without significant abnormality except for right ear ulcer was much smaller but still open which was dressed with a dot bandage and bacitracin       Diagnostic Test Results:  Results for orders placed or performed in visit on 10/09/18   UA " reflex to Microscopic and Culture   Result Value Ref Range    Color Urine Yellow     Appearance Urine Clear     Glucose Urine Negative NEG^Negative mg/dL    Bilirubin Urine Negative NEG^Negative    Ketones Urine Negative NEG^Negative mg/dL    Specific Gravity Urine 1.020 1.003 - 1.035    Blood Urine Negative NEG^Negative    pH Urine 7.5 (H) 5.0 - 7.0 pH    Protein Albumin Urine Negative NEG^Negative mg/dL    Urobilinogen Urine 0.2 0.2 - 1.0 EU/dL    Nitrite Urine Negative NEG^Negative    Leukocyte Esterase Urine Negative NEG^Negative    Source Urine    CBC with platelets   Result Value Ref Range    WBC 5.0 4.0 - 11.0 10e9/L    RBC Count 4.63 4.4 - 5.9 10e12/L    Hemoglobin 15.5 13.3 - 17.7 g/dL    Hematocrit 45.2 40.0 - 53.0 %    MCV 98 78 - 100 fl    MCH 33.5 (H) 26.5 - 33.0 pg    MCHC 34.3 31.5 - 36.5 g/dL    RDW 12.7 10.0 - 15.0 %    Platelet Count 264 150 - 450 10e9/L   Comprehensive metabolic panel   Result Value Ref Range    Sodium 135 133 - 144 mmol/L    Potassium 4.3 3.4 - 5.3 mmol/L    Chloride 103 94 - 109 mmol/L    Carbon Dioxide 26 20 - 32 mmol/L    Anion Gap 6 3 - 14 mmol/L    Glucose 87 70 - 99 mg/dL    Urea Nitrogen 11 7 - 30 mg/dL    Creatinine 0.91 0.66 - 1.25 mg/dL    GFR Estimate 81 >60 mL/min/1.7m2    GFR Estimate If Black >90 >60 mL/min/1.7m2    Calcium 8.8 8.5 - 10.1 mg/dL    Bilirubin Total 0.8 0.2 - 1.3 mg/dL    Albumin 3.8 3.4 - 5.0 g/dL    Protein Total 7.5 6.8 - 8.8 g/dL    Alkaline Phosphatase 64 40 - 150 U/L    ALT 26 0 - 70 U/L    AST 23 0 - 45 U/L   Lipid panel reflex to direct LDL Fasting   Result Value Ref Range    Cholesterol 186 <200 mg/dL    Triglycerides 125 <150 mg/dL    HDL Cholesterol 71 >39 mg/dL    LDL Cholesterol Calculated 90 <100 mg/dL    Non HDL Cholesterol 115 <130 mg/dL   TSH with free T4 reflex   Result Value Ref Range    TSH 1.63 0.40 - 4.00 mU/L     Labs were reviewed and discussed.     ASSESSMENT/PLAN:   1. Benign essential hypertension  Well controlled, continue  medicaiton    2. Hyperlipidemia LDL goal <130  Labs reviewed, LDL levels well controlled    3. Hypothyroidism due to acquired atrophy of thyroid  Labs reviewed, stable, continue medication    4. Actinic keratosis  Certainly as to whether this relates to recent liquid nitrogen and subsequent trauma versus basal cell cancer  Improving, ulcer is shrinking however still open, recommended to keep covered and redress with bacitracin as needed, if ulcer still present during the spring of next year we will re-evaluate    5. Sprain of left rotator cuff capsule, initial encounter  Improving, no action required at this time, continue monitoring for improvement       The information in this document, created by the medical scribe for me, accurately reflects the services I personally performed and the decisions made by me. I have reviewed and approved this document for accuracy prior to leaving the patient care area.  2:40 PM, 10/22/18      Perez Carr MD  [facility]

## 2018-10-22 NOTE — MR AVS SNAPSHOT
"              After Visit Summary   10/22/2018    Nilo Nelson    MRN: [id number]           Patient Information     Date Of Birth          1940        Visit Information        Provider Department      10/22/2018 2:00 PM Perez Carr MD [facility]        Today's Diagnoses     Benign essential hypertension    -  1    Hyperlipidemia LDL goal <130        Hypothyroidism due to acquired atrophy of thyroid        Actinic keratosis        Sprain of left rotator cuff capsule, initial encounter           Follow-ups after your visit        Your next 10 appointments already scheduled     Oct 23, 2018 11:20 AM CDT   DAY[facility] Extremity with Bryan Gaspar, PT   [facility] (DAYANARA Marla  )    [address]  [facility] [address] [phone]              Who to contact     If you have questions or need follow up information about today's clinic visit or your schedule please contact [facility] directly at [phone].  Normal or non-critical lab and imaging results will be communicated to you by MyChart, letter or phone within 4 business days after the clinic has received the results. If you do not hear from us within 7 days, please contact the clinic through MyChart or phone. If you have a critical or abnormal lab result, we will notify you by phone as soon as possible.  Submit refill requests through [employer] or call your pharmacy and they will forward the refill request to us. Please allow 3 business days for your refill to be completed.          Additional Information About Your Visit        Care EveryWhere ID     This is your Care EveryWhere ID. This could be used by other organizations to access your [facility] medical records  [id number]        Your Vitals Were     Pulse Temperature Height Pulse Oximetry BMI (Body Mass Index)       73 97.6  F (36.4  C) (Oral) 5' 7\" (1.702 m) 97% 25.37 kg/m2        Blood Pressure from Last 3 Encounters: "   10/22/18 113/71   10/09/18 112/72   09/19/18 132/80    Weight from Last 3 Encounters:   10/22/18 162 lb (73.5 kg)   10/09/18 162 lb (73.5 kg)   09/19/18 165 lb (74.8 kg)              Today, you had the following     No orders found for display       Primary Care Provider Office Phone # Fax #    Perez Carr [phone] [address] [facility] [address]  [facility] [address]        Equal Access to Services     [facility]: Hadii aad ku hadasho Soomaali, waaxda luqadaha, qaybta kaalmada adechristen, loni garland . So [facility] [phone].    ATENCIÓN: Si habla español, tiene a martinez disposición servicios gratuitos de asistencia lingüística. Llame al [phone].    We comply with applicable federal civil rights laws and [place] laws. We do not discriminate on the basis of race, color, national origin, age, disability, sex, sexual orientation, or gender identity.            Thank you!     Thank you for choosing [facility]  for your care. Our goal is always to provide you with excellent care. Hearing back from our patients is one way we can continue to improve our services. Please take a few minutes to complete the written survey that you may receive in the mail after your visit with us. Thank you!             Your Updated Medication List - Protect others around you: Learn how to safely use, store and throw away your medicines at www.disposemymeds.org.          This list is accurate as of 10/22/18 11:59 PM.  Always use your most recent med list.                   Brand Name Dispense Instructions for use Diagnosis    aspirin 81 MG tablet      Take 1 tablet by mouth daily.        CENTRUM SILVER per tablet      Take 1 tablet by mouth daily.        CLARITIN PO      Take  by mouth as needed.        GLUCOSAMINE CHONDROITIN COMPLX PO      Take 1 tablet by mouth daily.        levothyroxine 75 MCG tablet    SYNTHROID/LEVOTHROID    90 tablet    TAKE 1 TABLET(75 MCG) BY MOUTH  DAILY    Hypothyroidism due to acquired atrophy of thyroid       METAMUCIL PO      Take 2 tsp by mouth daily.        simvastatin 10 MG tablet    ZOCOR    90 tablet    TAKE 1 TABLET(10 MG) BY MOUTH AT BEDTIME    Hyperlipidemia, unspecified hyperlipidemia type       tamsulosin 0.4 MG capsule    FLOMAX    180 capsule    TAKE 1 CAPSULE(0.4 MG) BY MOUTH TWICE DAILY    Prostatism       VITAMIN D (CHOLECALCIFEROL) PO      Take 600 tablets by mouth daily

## 2018-10-23 ENCOUNTER — THERAPY VISIT (OUTPATIENT)
Dept: PHYSICAL THERAPY | Facility: CLINIC | Age: 78
End: 2018-10-23
Payer: MEDICARE

## 2018-10-23 DIAGNOSIS — S43.422A SPRAIN OF LEFT ROTATOR CUFF CAPSULE, INITIAL ENCOUNTER: ICD-10-CM

## 2018-10-23 PROCEDURE — G8988 SELF CARE GOAL STATUS: HCPCS | Mod: GP | Performed by: PHYSICAL THERAPIST

## 2018-10-23 PROCEDURE — G8989 SELF CARE D/C STATUS: HCPCS | Mod: GP | Performed by: PHYSICAL THERAPIST

## 2018-10-23 PROCEDURE — 97140 MANUAL THERAPY 1/> REGIONS: CPT | Mod: GP | Performed by: PHYSICAL THERAPIST

## 2018-10-23 PROCEDURE — 97112 NEUROMUSCULAR REEDUCATION: CPT | Mod: GP | Performed by: PHYSICAL THERAPIST

## 2018-10-23 PROCEDURE — 97110 THERAPEUTIC EXERCISES: CPT | Mod: GP | Performed by: PHYSICAL THERAPIST

## 2018-10-23 NOTE — PROGRESS NOTES
Subjective:  HPI                    Objective:  System    Physical Exam    General     ROS    Assessment/Plan:    DISCHARGE REPORT    Progress reporting period is from 9/20/18 to 10/23/18 (5 visits).       SUBJECTIVE  Subjective changes noted by patient:  Notes that he saw Dr Carr and did look at the soft tissue spot in his L deltoid area.  Patient notes MD wasn't concerned and advised to continue his exercises. Patient notes symptoms are better overall, just pain initiating movement and pulling up his pants (Eg). Patient feels he will do well with his HEP and notes he will be moving to [place] for the winter.     Current Pain level:  (0-4/10).     Initial Pain level: 8/10.   Changes in function:  Yes (See Goal flowsheet attached for changes in current functional level)  Adverse reaction to treatment or activity: None    OBJECTIVE  Changes noted in objective findings:  Yes,   Objective: Shoulder: AROM: pain initiating flexion and abduction, no pain IR, ER or ext/IR.  Shoulder strength 4+/5 with empty can and ER.  Remaining motions are 5/5.      ASSESSMENT/PLAN  Updated problem list and treatment plan: Diagnosis 1:  L shoulder rotator cuff tendinopathy    Pain -  home program  Decreased ROM/flexibility - home program  Decreased strength - home program  Decreased proprioception - home program  Decreased function - home program  STG/LTGs have been met or progress has been made towards goals:  Yes (See Goal flow sheet completed today.)  Assessment of Progress: The patient's condition is improving.  Self Management Plans:  Patient has been instructed in a home treatment program.  I have re-evaluated this patient and find that the nature, scope, duration and intensity of the therapy is appropriate for the medical condition of the patient.  Nilo continues to require the following intervention to meet STG and LTG's:  PT intervention is no longer required to meet STG/LTG.    Recommendations:  This patient is ready to be  discharged from therapy and continue their home treatment program.    Please refer to the daily flowsheet for treatment today, total treatment time and time spent performing 1:1 timed codes.

## 2019-02-11 ENCOUNTER — TELEPHONE (OUTPATIENT)
Dept: FAMILY MEDICINE | Facility: CLINIC | Age: 79
End: 2019-02-11

## 2019-02-11 NOTE — TELEPHONE ENCOUNTER
"Returned call to patient.     Patient in [place] for the winter. Reports he will be moving to [place] this summer, permanently.        Symptoms:  Small crusty area on outside \"rim\" of right ear.  Area is \"sore\" and bleeds on and off.   States Dr Carr has used liquid nitrogen on the area \"2 or 3 times\" but it keeps coming back.     Advised Dermatology.  Patient agreed and will check with his insurance and schedule appointment to be seen by Derm.   Patient plans to est care with new PCP in [place] within the next few months.     Adelina GAXIOLA RN,BSN        "

## 2019-02-11 NOTE — TELEPHONE ENCOUNTER
Reason for call:  Patient reporting a symptom    Symptom or request: spot on ear that keeps coming back.  Dr Carr froze the spot in September.  It keeps coming back and bleeding, he is not sure what to do while in [place]    Duration (how long have symptoms been present): on going      Have you been treated for this before? Yes    Additional comments:     Phone Number patient can be reached at:  Cell number on file:    Telephone Information:   Mobile [phone]       Best Time:  any    Can we leave a detailed message on this number:  YES    Call taken on 2/11/2019 at 11:28 AM by Lisbet Odell

## 2019-03-05 DIAGNOSIS — E78.5 HYPERLIPIDEMIA, UNSPECIFIED HYPERLIPIDEMIA TYPE: ICD-10-CM

## 2019-03-05 NOTE — TELEPHONE ENCOUNTER
"simvastatin (ZOCOR) 10 MG tablet    Last Written Prescription Date:  10/10/2018  Last Fill Quantity: 90,  # refills: 3   Last office visit: 10/22/2018 with prescribing provider:  Lilly   Future Office Visit:  Unknown     Requested Prescriptions   Pending Prescriptions Disp Refills     simvastatin (ZOCOR) 10 MG tablet [Pharmacy Med Name: SIMVASTATIN 10MG TABLETS] 90 tablet 0     Sig: TAKE 1 TABLET BY MOUTH EVERY DAY IN THE EVENING    Statins Protocol Passed - 3/5/2019 12:43 PM       Passed - LDL on file in past 12 months    Recent Labs   Lab Test 10/09/18  1047   LDL 90            Passed - No abnormal creatine kinase in past 12 months    No lab results found.            Passed - Recent (12 mo) or future (30 days) visit within the authorizing provider's specialty    Patient had office visit in the last 12 months or has a visit in the next 30 days with authorizing provider or within the authorizing provider's specialty.  See \"Patient Info\" tab in inbasket, or \"Choose Columns\" in Meds & Orders section of the refill encounter.             Passed - Medication is active on med list       Passed - Patient is age 18 or older          "

## 2019-03-06 RX ORDER — SIMVASTATIN 10 MG
TABLET ORAL
Qty: 90 TABLET | Refills: 0 | Status: SHIPPED | OUTPATIENT
Start: 2019-03-06 | End: 2019-08-06

## 2019-03-06 NOTE — TELEPHONE ENCOUNTER
[place]  Prescription approved per [facility] Refill Protocol.  Patricia Casanova RN- Triage [employer]

## 2019-04-04 ENCOUNTER — TRANSFERRED RECORDS (OUTPATIENT)
Dept: HEALTH INFORMATION MANAGEMENT | Facility: CLINIC | Age: 79
End: 2019-04-04

## 2019-04-11 ENCOUNTER — TRANSFERRED RECORDS (OUTPATIENT)
Dept: HEALTH INFORMATION MANAGEMENT | Facility: CLINIC | Age: 79
End: 2019-04-11

## 2019-06-07 DIAGNOSIS — N40.0 PROSTATISM: ICD-10-CM

## 2019-06-07 NOTE — TELEPHONE ENCOUNTER
"Last Written Prescription Date:  6/06/18  Last Fill Quantity: 180 capsule,  # refills: 3   Last office visit: 10/22/2018 with prescribing provider:  Lilly   Future Office Visit:      Requested Prescriptions   Pending Prescriptions Disp Refills     tamsulosin (FLOMAX) 0.4 MG capsule [Pharmacy Med Name: TAMSULOSIN 0.4MG CAPSULES] 180 capsule 0     Sig: TAKE 1 CAPSULE(0.4 MG) BY MOUTH TWICE DAILY       Alpha Blockers Passed - 6/7/2019  3:30 PM        Passed - Blood pressure under 140/90 in past 12 months     BP Readings from Last 3 Encounters:   10/22/18 113/71   10/09/18 112/72   09/19/18 132/80                 Passed - Recent (12 mo) or future (30 days) visit within the authorizing provider's specialty     Patient had office visit in the last 12 months or has a visit in the next 30 days with authorizing provider or within the authorizing provider's specialty.  See \"Patient Info\" tab in inbasket, or \"Choose Columns\" in Meds & Orders section of the refill encounter.              Passed - Patient does not have Tadalafil, Vardenafil, or Sildenafil on their medication list        Passed - Medication is active on med list        Passed - Patient is 18 years of age or older          "

## 2019-06-10 RX ORDER — TAMSULOSIN HYDROCHLORIDE 0.4 MG/1
CAPSULE ORAL
Qty: 180 CAPSULE | Refills: 0 | Status: SHIPPED | OUTPATIENT
Start: 2019-06-10 | End: 2019-09-14

## 2019-08-06 ENCOUNTER — TELEPHONE (OUTPATIENT)
Dept: UROLOGY | Facility: CLINIC | Age: 79
End: 2019-08-06

## 2019-08-06 ENCOUNTER — TELEPHONE (OUTPATIENT)
Dept: FAMILY MEDICINE | Facility: CLINIC | Age: 79
End: 2019-08-06

## 2019-08-06 ENCOUNTER — OFFICE VISIT (OUTPATIENT)
Dept: FAMILY MEDICINE | Facility: CLINIC | Age: 79
End: 2019-08-06
Payer: MEDICARE

## 2019-08-06 VITALS
OXYGEN SATURATION: 96 % | BODY MASS INDEX: 25.11 KG/M2 | DIASTOLIC BLOOD PRESSURE: 71 MMHG | TEMPERATURE: 97.3 F | SYSTOLIC BLOOD PRESSURE: 112 MMHG | HEIGHT: 67 IN | HEART RATE: 73 BPM | WEIGHT: 160 LBS

## 2019-08-06 DIAGNOSIS — D72.829 LEUKOCYTOSIS, UNSPECIFIED TYPE: ICD-10-CM

## 2019-08-06 DIAGNOSIS — N39.0 URINARY TRACT INFECTION WITH HEMATURIA, SITE UNSPECIFIED: ICD-10-CM

## 2019-08-06 DIAGNOSIS — R31.0 GROSS HEMATURIA: ICD-10-CM

## 2019-08-06 DIAGNOSIS — R31.0 GROSS HEMATURIA: Primary | ICD-10-CM

## 2019-08-06 DIAGNOSIS — R97.20 ELEVATED PSA: Primary | ICD-10-CM

## 2019-08-06 DIAGNOSIS — R31.9 URINARY TRACT INFECTION WITH HEMATURIA, SITE UNSPECIFIED: ICD-10-CM

## 2019-08-06 DIAGNOSIS — Z12.5 SCREENING FOR PROSTATE CANCER: ICD-10-CM

## 2019-08-06 DIAGNOSIS — E87.1 HYPONATREMIA: ICD-10-CM

## 2019-08-06 LAB
ALBUMIN SERPL-MCNC: 3.8 G/DL (ref 3.4–5)
ALBUMIN UR-MCNC: >=300 MG/DL
ALP SERPL-CCNC: 84 U/L (ref 40–150)
ALT SERPL W P-5'-P-CCNC: 21 U/L (ref 0–70)
ANION GAP SERPL CALCULATED.3IONS-SCNC: 1 MMOL/L (ref 3–14)
APPEARANCE UR: ABNORMAL
AST SERPL W P-5'-P-CCNC: 17 U/L (ref 0–45)
BACTERIA #/AREA URNS HPF: ABNORMAL /HPF
BASOPHILS # BLD AUTO: 0 10E9/L (ref 0–0.2)
BASOPHILS NFR BLD AUTO: 0.3 %
BILIRUB SERPL-MCNC: 0.7 MG/DL (ref 0.2–1.3)
BILIRUB UR QL STRIP: NEGATIVE
BUN SERPL-MCNC: 10 MG/DL (ref 7–30)
CALCIUM SERPL-MCNC: 8.9 MG/DL (ref 8.5–10.1)
CHLORIDE SERPL-SCNC: 103 MMOL/L (ref 94–109)
CO2 SERPL-SCNC: 29 MMOL/L (ref 20–32)
COLOR UR AUTO: ABNORMAL
CREAT SERPL-MCNC: 0.74 MG/DL (ref 0.66–1.25)
CRP SERPL-MCNC: 33 MG/L (ref 0–8)
DIFFERENTIAL METHOD BLD: ABNORMAL
EOSINOPHIL # BLD AUTO: 0.1 10E9/L (ref 0–0.7)
EOSINOPHIL NFR BLD AUTO: 0.8 %
ERYTHROCYTE [DISTWIDTH] IN BLOOD BY AUTOMATED COUNT: 12.6 % (ref 10–15)
ERYTHROCYTE [SEDIMENTATION RATE] IN BLOOD BY WESTERGREN METHOD: 10 MM/H (ref 0–20)
GFR SERPL CREATININE-BSD FRML MDRD: 87 ML/MIN/{1.73_M2}
GLUCOSE SERPL-MCNC: 84 MG/DL (ref 70–99)
GLUCOSE UR STRIP-MCNC: NEGATIVE MG/DL
HCT VFR BLD AUTO: 43.7 % (ref 40–53)
HGB BLD-MCNC: 15.3 G/DL (ref 13.3–17.7)
HGB UR QL STRIP: ABNORMAL
INR PPP: 0.98 (ref 0.86–1.14)
KETONES UR STRIP-MCNC: NEGATIVE MG/DL
LEUKOCYTE ESTERASE UR QL STRIP: ABNORMAL
LYMPHOCYTES # BLD AUTO: 1.3 10E9/L (ref 0.8–5.3)
LYMPHOCYTES NFR BLD AUTO: 11.3 %
MCH RBC QN AUTO: 33.6 PG (ref 26.5–33)
MCHC RBC AUTO-ENTMCNC: 35 G/DL (ref 31.5–36.5)
MCV RBC AUTO: 96 FL (ref 78–100)
MONOCYTES # BLD AUTO: 0.9 10E9/L (ref 0–1.3)
MONOCYTES NFR BLD AUTO: 8.1 %
NEUTROPHILS # BLD AUTO: 8.9 10E9/L (ref 1.6–8.3)
NEUTROPHILS NFR BLD AUTO: 79.5 %
NITRATE UR QL: POSITIVE
NON-SQ EPI CELLS #/AREA URNS LPF: ABNORMAL /LPF
PH UR STRIP: 7.5 PH (ref 5–7)
PLATELET # BLD AUTO: 284 10E9/L (ref 150–450)
POTASSIUM SERPL-SCNC: 4.2 MMOL/L (ref 3.4–5.3)
PROT SERPL-MCNC: 7.4 G/DL (ref 6.8–8.8)
PSA SERPL-ACNC: 14.4 UG/L (ref 0–4)
RBC # BLD AUTO: 4.56 10E12/L (ref 4.4–5.9)
RBC #/AREA URNS AUTO: >100 /HPF
SODIUM SERPL-SCNC: 133 MMOL/L (ref 133–144)
SOURCE: ABNORMAL
SP GR UR STRIP: 1.02 (ref 1–1.03)
UROBILINOGEN UR STRIP-ACNC: 0.2 EU/DL (ref 0.2–1)
WBC # BLD AUTO: 11.2 10E9/L (ref 4–11)
WBC #/AREA URNS AUTO: ABNORMAL /HPF
YEAST #/AREA URNS HPF: ABNORMAL /HPF

## 2019-08-06 PROCEDURE — 85652 RBC SED RATE AUTOMATED: CPT | Performed by: INTERNAL MEDICINE

## 2019-08-06 PROCEDURE — 81001 URINALYSIS AUTO W/SCOPE: CPT | Performed by: INTERNAL MEDICINE

## 2019-08-06 PROCEDURE — G0103 PSA SCREENING: HCPCS | Performed by: INTERNAL MEDICINE

## 2019-08-06 PROCEDURE — 80053 COMPREHEN METABOLIC PANEL: CPT | Performed by: INTERNAL MEDICINE

## 2019-08-06 PROCEDURE — 36415 COLL VENOUS BLD VENIPUNCTURE: CPT | Performed by: INTERNAL MEDICINE

## 2019-08-06 PROCEDURE — 86140 C-REACTIVE PROTEIN: CPT | Performed by: INTERNAL MEDICINE

## 2019-08-06 PROCEDURE — 85610 PROTHROMBIN TIME: CPT | Performed by: INTERNAL MEDICINE

## 2019-08-06 PROCEDURE — 85025 COMPLETE CBC W/AUTO DIFF WBC: CPT | Performed by: INTERNAL MEDICINE

## 2019-08-06 PROCEDURE — 87086 URINE CULTURE/COLONY COUNT: CPT | Performed by: INTERNAL MEDICINE

## 2019-08-06 PROCEDURE — 99214 OFFICE O/P EST MOD 30 MIN: CPT | Performed by: INTERNAL MEDICINE

## 2019-08-06 RX ORDER — L.ACIDOPH/B.ANIMALIS/B.LONGUM 15B CELL
CAPSULE ORAL
Qty: 14 CAPSULE | Refills: 0 | Status: SHIPPED | OUTPATIENT
Start: 2019-08-06

## 2019-08-06 RX ORDER — CIPROFLOXACIN 500 MG/1
500 TABLET, FILM COATED ORAL 2 TIMES DAILY
Qty: 14 TABLET | Refills: 0 | Status: SHIPPED | OUTPATIENT
Start: 2019-08-06

## 2019-08-06 ASSESSMENT — MIFFLIN-ST. JEOR: SCORE: 1394.39

## 2019-08-06 NOTE — TELEPHONE ENCOUNTER
Reason for Call: Request for an order or referral:    Order or referral being requested: per pt's AVS he needs to come in for a WBC  Please call him to schedule once order is placed    Date needed: at your convenience    Has the patient been seen by the PCP for this problem? YES    Additional comments: call once placed    Phone number Patient can be reached at:  Home number on file [phone] (home)    Best Time:  anytime    Can we leave a detailed message on this number?  YES    Call taken on 8/6/2019 at 4:20 PM by Lucas Jerez

## 2019-08-06 NOTE — TELEPHONE ENCOUNTER
Please see below.  Place future lab order for WBC and route back to us to call patient to schedule.   Moriah De Los Santos MA

## 2019-08-06 NOTE — RESULT ENCOUNTER NOTE
Patient notified of the UA and the CBC finding in the clinic.  CBC shows leukocytosis and UA suggestive of UTI with some blood in the urine and positive nitrite and leukocyte esterase.

## 2019-08-06 NOTE — PROGRESS NOTES
"Subjective     Nilo Nelson is a 79 year old male who presents to clinic today for the following health issues:    HPI   RN Triaged today:  On Sunday, 8-4-2019,  symptoms started with difficulty and painful urination.   On 8-5-2019 there was \"quite a bit of blood in the urine and today more.\"  Last night pt says he was up every 45 min urinating \"a little bit.\"  Urinating \"hurts and is very uncomfortable.\"     Feels fine for 1/2 hr then feels he has to urinate.  Denies fevers or back pain.  Feels good otherwise but tired and pt thinks it may be from being up a lot of the night last night.  No Hx of UTIs or prostatis per pt and does not use a catheter.     Patient is presenting for evaluation of gross hematuria and some dysuria and difficulty urinating.  Symptoms started last Sunday where he had difficulty voiding all day and had frequency and then in the evening he saw some dark-colored urine.  Next day on Monday he felt better during the day, but in the evening he had some jonathan blood in the urine.  He denies any suprapubic or abdominal pain, denies any flank pain, denies any back pain per se, denies any fevers or chills.  Denies any diarrhea, no blood in the stools or melena or constipation, denies any fevers or chills.  Denies any nausea or vomiting.  Denies any dizziness or lightheadedness or lethargy or fatigue or change of mental status.  Denies any leg pain or swelling. He does have history of prostatism and he is maintained on Flomax 0.4 mg twice daily which is well-tolerated.  Adequate void stream is in the past prior to the onset of his new symptoms on Sunday.  His last PSA 2016 was 8.7.  Denies any weight loss issues.  Denies any pelvic or perineal pain or perineal pressure per se.    Patient Active Problem List   Diagnosis     Benign essential hypertension     Hyperlipidemia LDL goal <130     Prostatism     Hypothyroidism due to acquired atrophy of thyroid     Advanced directives, " counseling/discussion     Actinic keratosis     Gross hematuria     Leukocytosis, unspecified type     Urinary tract infection with hematuria, site unspecified     Past Surgical History:   Procedure Laterality Date     BACK SURGERY      LUMBAR DISC     COLONOSCOPY  10/18/2012    Procedure: COLONOSCOPY;  COLONOSCOPY ;  Surgeon: Zain Lockhart MD;  Location: [facility]     LAPAROSCOPIC HERNIORRHAPHY INGUINAL BILATERAL  10/18/2011    Procedure:LAPAROSCOPIC HERNIORRHAPHY INGUINAL BILATERAL; Laparoscopic Bilateral Inguinal Hernia Repair with Mesh; Surgeon:MITESH GREENFIELD; Location:[facility]       Social History     Tobacco Use     Smoking status: Former Smoker     Smokeless tobacco: Never Used     Tobacco comment: per encounter 3/9/07   Substance Use Topics     Alcohol use: Yes     Alcohol/week: 0.0 oz     Comment: 1 day     No family history on file.      Current Outpatient Medications   Medication Sig Dispense Refill     aspirin 81 MG tablet Take 1 tablet by mouth daily.       ciprofloxacin (CIPRO) 500 MG tablet Take 1 tablet (500 mg) by mouth 2 times daily 14 tablet 0     GLUCOSAMINE CHONDROITIN COMPLX PO Take 1 tablet by mouth daily.       levothyroxine (SYNTHROID/LEVOTHROID) 75 MCG tablet TAKE 1 TABLET(75 MCG) BY MOUTH DAILY 90 tablet 3     Loratadine (CLARITIN PO) Take  by mouth as needed.       Multiple Vitamins-Minerals (CENTRUM SILVER) per tablet Take 1 tablet by mouth daily.       Probiotic Product (FLORAJEN3) CAPS 1 CAPSULE ONCE DAILY 14 capsule 0     Psyllium (METAMUCIL PO) Take 2 tsp by mouth daily.       simvastatin (ZOCOR) 10 MG tablet TAKE 1 TABLET(10 MG) BY MOUTH AT BEDTIME 90 tablet 3     tamsulosin (FLOMAX) 0.4 MG capsule TAKE 1 CAPSULE(0.4 MG) BY MOUTH TWICE DAILY 180 capsule 0     VITAMIN D, CHOLECALCIFEROL, PO Take 600 tablets by mouth daily       No Known Allergies  Recent Labs   Lab Test 08/12/19  0945 08/06/19  1119 10/09/18  1047 10/05/17  1057 10/04/16  1424  01/10/13   A1C  --   --   --   --   --    "--  5.1   LDL  --   --  90 96 118*   < >  --    HDL  --   --  71 70 66   < >  --    TRIG  --   --  125 128 159*   < >  --    ALT  --  21 26 27 25   < >  --    CR 0.84 0.74 0.91 0.92 0.83   < >  --    GFRESTIMATED 83 87 81 79 90   < >  --    GFRESTBLACK >90 >90 >90 >90 >90  African American GFR Calc     < >  --    POTASSIUM 4.2 4.2 4.3 4.7 4.7   < >  --    TSH  --   --  1.63 1.47 1.50   < >  --     < > = values in this interval not displayed.      BP Readings from Last 3 Encounters:   08/12/19 128/68   08/06/19 112/71   10/22/18 113/71    Wt Readings from Last 3 Encounters:   08/12/19 72.6 kg (160 lb)   08/06/19 72.6 kg (160 lb)   10/22/18 73.5 kg (162 lb)                      Reviewed and updated as needed this visit by Provider  Tobacco  Allergies  Meds  Med Hx  Surg Hx  Soc Hx        Review of Systems   ROS COMP: Constitutional, HEENT, cardiovascular, pulmonary, gi and gu systems are negative, except as otherwise noted.      Objective    /71 (BP Location: Left arm, Cuff Size: Adult Regular)   Pulse 73   Temp 97.3  F (36.3  C) (Oral)   Ht 1.702 m (5' 7\")   Wt 72.6 kg (160 lb)   SpO2 96%   BMI 25.06 kg/m    Body mass index is 25.06 kg/m .  Physical Exam   GENERAL: healthy, alert and no distress  NECK: no adenopathy, no asymmetry, masses, or scars and thyroid normal to palpation  RESP: lungs clear to auscultation - no rales, rhonchi or wheezes  CV: regular rate and rhythm, normal S1 S2, no S3 or S4, no murmur, click or rub, no peripheral edema and peripheral pulses strong  ABDOMEN: soft, nontender, no hepatosplenomegaly, no masses and bowel sounds slightly hyperactive  RECTAL (male): normal sphincter tone, no rectal masses and prostate enlarged non tender.  MS: no gross musculoskeletal defects noted, no edema  NEURO: Normal strength and tone, mentation intact and speech normal  BACK: no CVA tenderness, no paralumbar tenderness  PSYCH: mentation appears normal, affect normal/bright    Diagnostic Test " Results:  Labs reviewed in Epic        Assessment & Plan   Problem List Items Addressed This Visit        Immune    Leukocytosis, unspecified type    Relevant Medications    ciprofloxacin (CIPRO) 500 MG tablet    Other Relevant Orders    CRP, inflammation (Completed)    ESR: Erythrocyte sedimentation rate (Completed)       Urinary    Gross hematuria - Primary    Relevant Medications    ciprofloxacin (CIPRO) 500 MG tablet    Probiotic Product (FLORAJEN3) CAPS    Other Relevant Orders    Urine Culture Aerobic Bacterial (Completed)    CRP, inflammation (Completed)    ESR: Erythrocyte sedimentation rate (Completed)    UROLOGY ADULT REFERRAL    Urinary tract infection with hematuria, site unspecified    Relevant Medications    ciprofloxacin (CIPRO) 500 MG tablet    Probiotic Product (FLORAJEN3) CAPS    Other Relevant Orders    UROLOGY ADULT REFERRAL      Clinical decision-making urinalysis is positive for nitrites and leukocyte esterase and blood probable acute bacterial cystitis  He has prostatism, is maintained on Flomax ; keep the same dose.  CBC showed leukocytosis with some left shift.  We will add CRP and ESR, Comprehensive panel and urine cultures pending as well as PSA level.  His prostate exam was nontender although enlarged and boggy, no rectal masses per se and abdominal exam was benign no suprapubic tenderness or CVA tenderness.  Patient denies any systemic signs or symptoms of illness, no fevers or chills.  We will go ahead and treat him with 7 days course of Cipro 500 mg twice daily, he has no allergies to Cipro, he does have history of C. difficile years ago, started him on probiotic Florajen3, 1 capsule once daily, keep well-hydrated; if any diarrhea or abdominal pain or blood in stool then stop antibiotics and call us immediately.  Patient to follow-up with his PCP next week, also an urgent referral was put to urology; for cystoscopy and further evaluation. advised to keep well-hydrated [to avoid having  "any blood clots in urinary system].  Patient discharged from clinic in stable condition.    BMI:   Estimated body mass index is 25.06 kg/m  as calculated from the following:    Height as of this encounter: 1.702 m (5' 7\").    Weight as of this encounter: 72.6 kg (160 lb).   Weight management plan: Discussed healthy diet and exercise guidelines        Work on weight loss  Regular exercise  See Patient Instructions  Return in about 1 week (around 8/13/2019), or HEMATURIA.    Thiago Nuñez MD  [facility]      "

## 2019-08-06 NOTE — TELEPHONE ENCOUNTER
"  Dr. Nuñez,    On Sunday symptoms started with difficulty and painful urination.   Yesterday there was \"quite a bit of blood in the urine and today more.\"  Last night pt says he was up every 45 min urinating \"a little bit.\"  Urinating \"hurts and is very uncomfortable.\"    Feels fine for 1/2 hr then feels he has to urinate.  Denies fevers or back pain.  Feels good otherwise but tired and pt thinks it may be from being up a lot of the night last night.  No Hx of UTIs or prostitis per pt and does not use a catheter.      Pt will be in at 11:30 today unless otherwise noted by provider.    Thanks,  Robyn Pacheco RN    "

## 2019-08-06 NOTE — TELEPHONE ENCOUNTER
[facility] Call Center    Phone Message    May a detailed message be left on voicemail: yes    Reason for Call: Other: Pt is being referred to see a Urologist for gross hematuria and a UTI. The soonest available appointment at [facility] isn't coming up for another month, and the pt is not able to travel to another location. The referring provider said that the pt should be seen as soon as possible. Please give the pt a call back to discuss availability.     Action Taken: Message routed to:  [facility] ([facility]): Urology

## 2019-08-07 ENCOUNTER — TELEPHONE (OUTPATIENT)
Dept: FAMILY MEDICINE | Facility: CLINIC | Age: 79
End: 2019-08-07

## 2019-08-07 LAB
BACTERIA SPEC CULT: NO GROWTH
SPECIMEN SOURCE: NORMAL

## 2019-08-07 NOTE — RESULT ENCOUNTER NOTE
Lab results discussed with patient, urine culture is negative, CRP elevated 33, PSA elevated to 14 from 8.7, ESR is normal at 10 CBC shows mild leukocytosis of 11,000 with mild left shift.  Patient has scheduled appointment next Monday with urology.  Advised him to continue on Cipro for now he is on 500 mg twice daily well-tolerated takes probiotics as well.  his prostate exam did not elicit any tenderness but the prostate was enlarged.  He will definitely need a cystoscopy and  CT scan of the abdomen pelvis, further recommendation per urology;  patient reiterated understanding all questions answered.  Differential diagnosis may include bladder cancer, prostate cancer or prostatitis..

## 2019-08-07 NOTE — TELEPHONE ENCOUNTER
Spoke with patient:     Relayed results below. He has an appt to see Dr. Ruiz on Monday 8/12. We made a Lab Only appt for immediately after his visit with Dr. Ruiz.     Tiffany HAWTHORNE RN

## 2019-08-07 NOTE — TELEPHONE ENCOUNTER
Reason for Call:  Other returning call    Detailed comments: Pt returning call.     Phone Number Patient can be reached at: Home number on file [phone] (home)    Best Time: Anytime     Can we leave a detailed message on this number? YES    Call taken on 8/7/2019 at 3:40 PM by Rowena Hernandez

## 2019-08-07 NOTE — RESULT ENCOUNTER NOTE
Please notify/call patient of the following below:  PSA level is markedly increased to 14.4 which is concerning.  Need to follow-up with urology soon [before he leaves to [place]]  Sodium level is decreased 133, [possible dilutional from excessive drinking water versus SIADH related] , rest of electrolytes are normal kidney function is normal, liver tests are normal.  Bleeding test called INR is normal.  ESR which is inflammatory marker is normal at 10 which is reassuring.  Repeat BMP in 1 week to check his sodium level, and PSA level/trend and CBC/CRP  Follow-up urgently with urology as discussed  Any further questions happy to address.  Dr. Nuñez

## 2019-08-07 NOTE — TELEPHONE ENCOUNTER
Please notify patient   CRP another  inflammatory marker is elevated, [though ESR is not elevated]   As mentioned follow with urology and complete course of antibiotics   Please can we follow with urology to see if patient can be seen as early as possible,   Kalyani     Please notify/call patient of the following below:   PSA level is markedly increased to 14.4 which is concerning.  Need to follow-up with urology soon [before he leaves to [place]]   Sodium level is decreased 133, [possible dilutional from excessive drinking water versus SIADH related] , rest of electrolytes are normal kidney function is normal, liver tests are normal.   Bleeding test called INR is normal.   ESR which is inflammatory marker is normal at 10 which is reassuring.   Repeat BMP in 1 week to check his sodium level, and PSA level/trend and CBC/CRP   Follow-up urgently with urology as discussed   Any further questions happy to address.   Dr. Nuñez     Called pt- wife answered and pt not available, no C2C. Pt will call back.    Kamari FINK RN

## 2019-08-07 NOTE — RESULT ENCOUNTER NOTE
Please notify patient  CRP another  inflammatory marker is elevated, [though ESR is not elevated]  As mentioned follow with urology and complete course of antibiotics  Please can we follow with urology to see if patient can be seen as early as possible,   Kalyani

## 2019-08-12 ENCOUNTER — OFFICE VISIT (OUTPATIENT)
Dept: UROLOGY | Facility: CLINIC | Age: 79
End: 2019-08-12
Payer: MEDICARE

## 2019-08-12 VITALS
HEIGHT: 67 IN | DIASTOLIC BLOOD PRESSURE: 68 MMHG | WEIGHT: 160 LBS | BODY MASS INDEX: 25.11 KG/M2 | SYSTOLIC BLOOD PRESSURE: 128 MMHG

## 2019-08-12 DIAGNOSIS — R97.20 ELEVATED PROSTATE SPECIFIC ANTIGEN (PSA): ICD-10-CM

## 2019-08-12 DIAGNOSIS — R39.9 LOWER URINARY TRACT SYMPTOMS (LUTS): ICD-10-CM

## 2019-08-12 DIAGNOSIS — R31.0 GROSS HEMATURIA: ICD-10-CM

## 2019-08-12 DIAGNOSIS — R97.20 ELEVATED PSA: ICD-10-CM

## 2019-08-12 DIAGNOSIS — D72.829 LEUKOCYTOSIS, UNSPECIFIED TYPE: ICD-10-CM

## 2019-08-12 DIAGNOSIS — E87.1 HYPONATREMIA: ICD-10-CM

## 2019-08-12 DIAGNOSIS — R31.0 GROSS HEMATURIA: Primary | ICD-10-CM

## 2019-08-12 LAB
ALBUMIN UR-MCNC: NEGATIVE MG/DL
ALBUMIN UR-MCNC: NEGATIVE MG/DL
ANION GAP SERPL CALCULATED.3IONS-SCNC: 4 MMOL/L (ref 3–14)
APPEARANCE UR: CLEAR
APPEARANCE UR: CLEAR
BACTERIA #/AREA URNS HPF: ABNORMAL /HPF
BASOPHILS # BLD AUTO: 0 10E9/L (ref 0–0.2)
BASOPHILS NFR BLD AUTO: 0.6 %
BILIRUB UR QL STRIP: NEGATIVE
BILIRUB UR QL STRIP: NEGATIVE
BUN SERPL-MCNC: 8 MG/DL (ref 7–30)
CALCIUM SERPL-MCNC: 8.7 MG/DL (ref 8.5–10.1)
CHLORIDE SERPL-SCNC: 103 MMOL/L (ref 94–109)
CO2 SERPL-SCNC: 27 MMOL/L (ref 20–32)
COLOR UR AUTO: YELLOW
COLOR UR AUTO: YELLOW
CREAT SERPL-MCNC: 0.84 MG/DL (ref 0.66–1.25)
CRP SERPL-MCNC: 9.6 MG/L (ref 0–8)
DIFFERENTIAL METHOD BLD: NORMAL
EOSINOPHIL # BLD AUTO: 0.2 10E9/L (ref 0–0.7)
EOSINOPHIL NFR BLD AUTO: 2.3 %
ERYTHROCYTE [DISTWIDTH] IN BLOOD BY AUTOMATED COUNT: 12.3 % (ref 10–15)
GFR SERPL CREATININE-BSD FRML MDRD: 83 ML/MIN/{1.73_M2}
GLUCOSE SERPL-MCNC: 78 MG/DL (ref 70–99)
GLUCOSE UR STRIP-MCNC: NEGATIVE MG/DL
GLUCOSE UR STRIP-MCNC: NEGATIVE MG/DL
HCT VFR BLD AUTO: 43.4 % (ref 40–53)
HGB BLD-MCNC: 14.8 G/DL (ref 13.3–17.7)
HGB UR QL STRIP: NEGATIVE
HGB UR QL STRIP: NEGATIVE
KETONES UR STRIP-MCNC: NEGATIVE MG/DL
KETONES UR STRIP-MCNC: NEGATIVE MG/DL
LEUKOCYTE ESTERASE UR QL STRIP: ABNORMAL
LEUKOCYTE ESTERASE UR QL STRIP: NEGATIVE
LYMPHOCYTES # BLD AUTO: 1 10E9/L (ref 0.8–5.3)
LYMPHOCYTES NFR BLD AUTO: 15.8 %
MCH RBC QN AUTO: 32.7 PG (ref 26.5–33)
MCHC RBC AUTO-ENTMCNC: 34.1 G/DL (ref 31.5–36.5)
MCV RBC AUTO: 96 FL (ref 78–100)
MONOCYTES # BLD AUTO: 0.9 10E9/L (ref 0–1.3)
MONOCYTES NFR BLD AUTO: 13.7 %
NEUTROPHILS # BLD AUTO: 4.4 10E9/L (ref 1.6–8.3)
NEUTROPHILS NFR BLD AUTO: 67.6 %
NITRATE UR QL: NEGATIVE
NITRATE UR QL: NEGATIVE
PH UR STRIP: 7 PH (ref 5–7)
PH UR STRIP: 7 PH (ref 5–7)
PLATELET # BLD AUTO: 311 10E9/L (ref 150–450)
POTASSIUM SERPL-SCNC: 4.2 MMOL/L (ref 3.4–5.3)
PSA SERPL-MCNC: 12.8 UG/L (ref 0–4)
RBC # BLD AUTO: 4.53 10E12/L (ref 4.4–5.9)
RBC #/AREA URNS AUTO: ABNORMAL /HPF
RESULT: 305 ML
SODIUM SERPL-SCNC: 134 MMOL/L (ref 133–144)
SOURCE: ABNORMAL
SOURCE: ABNORMAL
SP GR UR STRIP: 1.01 (ref 1–1.03)
SP GR UR STRIP: 1.01 (ref 1–1.03)
UROBILINOGEN UR STRIP-ACNC: 0.2 EU/DL (ref 0.2–1)
UROBILINOGEN UR STRIP-ACNC: 0.2 EU/DL (ref 0.2–1)
WBC # BLD AUTO: 6.4 10E9/L (ref 4–11)
WBC #/AREA URNS AUTO: ABNORMAL /HPF

## 2019-08-12 PROCEDURE — 00000103 ZZHCL STATISTIC CYTO-FISH QC 88120: Performed by: UROLOGY

## 2019-08-12 PROCEDURE — 88112 CYTOPATH CELL ENHANCE TECH: CPT | Mod: 26 | Performed by: UROLOGY

## 2019-08-12 PROCEDURE — 81001 URINALYSIS AUTO W/SCOPE: CPT | Performed by: INTERNAL MEDICINE

## 2019-08-12 PROCEDURE — 86140 C-REACTIVE PROTEIN: CPT | Performed by: INTERNAL MEDICINE

## 2019-08-12 PROCEDURE — 84153 ASSAY OF PSA TOTAL: CPT | Performed by: INTERNAL MEDICINE

## 2019-08-12 PROCEDURE — 85025 COMPLETE CBC W/AUTO DIFF WBC: CPT | Performed by: INTERNAL MEDICINE

## 2019-08-12 PROCEDURE — 51798 US URINE CAPACITY MEASURE: CPT | Performed by: UROLOGY

## 2019-08-12 PROCEDURE — 80048 BASIC METABOLIC PNL TOTAL CA: CPT | Performed by: INTERNAL MEDICINE

## 2019-08-12 PROCEDURE — 88112 CYTOPATH CELL ENHANCE TECH: CPT | Performed by: UROLOGY

## 2019-08-12 PROCEDURE — 99203 OFFICE O/P NEW LOW 30 MIN: CPT | Mod: 25 | Performed by: UROLOGY

## 2019-08-12 PROCEDURE — 36415 COLL VENOUS BLD VENIPUNCTURE: CPT | Performed by: INTERNAL MEDICINE

## 2019-08-12 PROCEDURE — 81003 URINALYSIS AUTO W/O SCOPE: CPT | Performed by: UROLOGY

## 2019-08-12 ASSESSMENT — MIFFLIN-ST. JEOR: SCORE: 1394.39

## 2019-08-12 ASSESSMENT — PAIN SCALES - GENERAL: PAINLEVEL: NO PAIN (0)

## 2019-08-12 NOTE — RESULT ENCOUNTER NOTE
Please notify patient by sending following letter with copy of test results      Karena Corcoran,    This is to inform you regarding your test result.    I am covering doctor for Dr. Nuñez.  C-reactive protein which is test to check for inflammation is slightly elevated.  There is improvement in number  PSA test for prostate cancer screening is elevated and you have seen urologist today for that  Basic metabolic panel which includes electrolytes and blood sugar is satisfactory.  CBC result which includes Hemoglobin and  Platelet Counts is satisfactory.  Urine test result was satisfactory.  Follow-up with your primary care doctor          Sincerely,      Dr.Nasima Анна MD,FACP

## 2019-08-12 NOTE — Clinical Note
Reuben Nuñez, I saw Nilo today in consultation for his gross hematuria and LUTS.  We discussed that this warrants a hematuria work-up, and I will obtain a CT urogram, urine cytology, and have him return for cystoscopy.  I will keep you updated with these results and any necessary treatment plans.  He is planning to move to [place] full-time in 1 month.Thanks, Donavan Ruiz M.D.Cell: [phone]

## 2019-08-12 NOTE — PROGRESS NOTES
Urology Consult History and Physical  [facility]  Name: Nilo Nelson    MRN: [id number]   YOB: 1940       We were asked to see Nilo Nelson at the request of Dr. Nuñez for evaluation and treatment of gross hematuria.        Chief Complaint:   Gross hematuria and LUTS    History is obtained from the patient            History of Present Illness:   Nilo Nelson is a 79 year old male who is being seen for evaluation of gross hematuria.  Overnight from  to  he developed acute worsening of his difficulty urinating was unable to urinate for several hours.  He then urinated in the morning and noted that it was dark with quite a bit of blood.  He does have a long history of LUTS with frequency, urgency, straining, intermittent stream.  He has been on tamsulosin for at least 10 years.  No other prior episodes of acute urinary retention, UTI, hematuria.  He was seen by his primary care provider on  where a urinalysis was concerning for infection however a urine culture was negative.  He was started on a course of ciprofloxacin.    AUASS: 5-9-8-5-4-3-3 = 29  QOL = 4    UCx 19: Negative    Father diagnosed with prostate cancer in early 80s -  from this at 86.     He notes that he will be moving to [place] in 1 month.    (4 or >)  Location: Urine  Quality: significant LUTS and gross hematuria   Severity: gross hematuria   Duration: 19           Past Medical History:     Past Medical History:   Diagnosis Date     Hernia of unspecified site of abdominal cavity without mention of obstruction or gangrene      Hernia, abdominal      Hyperlipidemia      Hypothyroid      Prostate infection      Prostatism             Past Surgical History:     Past Surgical History:   Procedure Laterality Date     BACK SURGERY      LUMBAR DISC     COLONOSCOPY  10/18/2012    Procedure: COLONOSCOPY;  COLONOSCOPY ;  Surgeon: Zain Lockhart MD;  Location: [facility]     LAPAROSCOPIC  HERNIORRHAPHY INGUINAL BILATERAL  10/18/2011    Procedure:LAPAROSCOPIC HERNIORRHAPHY INGUINAL BILATERAL; Laparoscopic Bilateral Inguinal Hernia Repair with Mesh; Surgeon:MITESH GREENFIELD; Location:[facility]            Social History:     Social History     Tobacco Use     Smoking status: Former Smoker     Smokeless tobacco: Never Used     Tobacco comment: per encounter 3/9/07   Substance Use Topics     Alcohol use: Yes     Alcohol/week: 0.0 oz     Comment: 1 day       History   Smoking Status     Former Smoker   Smokeless Tobacco     Never Used     Comment: per encounter 3/9/07            Family History:   No family history on file.           Allergies:   No Known Allergies         Medications:     Current Outpatient Medications   Medication Sig     aspirin 81 MG tablet Take 1 tablet by mouth daily.     ciprofloxacin (CIPRO) 500 MG tablet Take 1 tablet (500 mg) by mouth 2 times daily     GLUCOSAMINE CHONDROITIN COMPLX PO Take 1 tablet by mouth daily.     levothyroxine (SYNTHROID/LEVOTHROID) 75 MCG tablet TAKE 1 TABLET(75 MCG) BY MOUTH DAILY     Loratadine (CLARITIN PO) Take  by mouth as needed.     Multiple Vitamins-Minerals (CENTRUM SILVER) per tablet Take 1 tablet by mouth daily.     Probiotic Product (FLORAJEN3) CAPS 1 CAPSULE ONCE DAILY     Psyllium (METAMUCIL PO) Take 2 tsp by mouth daily.     simvastatin (ZOCOR) 10 MG tablet TAKE 1 TABLET(10 MG) BY MOUTH AT BEDTIME     tamsulosin (FLOMAX) 0.4 MG capsule TAKE 1 CAPSULE(0.4 MG) BY MOUTH TWICE DAILY     VITAMIN D, CHOLECALCIFEROL, PO Take 600 tablets by mouth daily     No current facility-administered medications for this visit.              Review of Systems:     Skin: as above  Eyes: glasses  Ears/Nose/Throat: negative  Respiratory: No shortness of breath, dyspnea on exertion, cough, or hemoptysis  Cardiovascular: negative  Gastrointestinal: negative  Genitourinary: as above  Musculoskeletal: negative  Neurologic: negative  Psychiatric:  "negative  Hematologic/Lymphatic/Immunologic: negative  Endocrine: negative          Physical Exam:     Patient Vitals for the past 24 hrs:   BP Height Weight   08/12/19 0853 128/68 1.702 m (5' 7\") 72.6 kg (160 lb)     Body mass index is 25.06 kg/m .     General: age-appropriate appearing male in NAD  HEENT: Head AT/NC, EOMI, CN Grossly intact  Lungs: no respiratory distress, or pursed lip breathing  Heart: No obvious jugular venous distension present  Back: no bony midline tenderness, no CVAT bilaterally.  Abdomen: soft, non-distended, non-tender. No organomegaly  : normal male external genitalia.   Rectal: ~50cc gland, smooth and symmetric firmness, no discrete nodules   Lymph: no palpable inguinal lymphadenopathy.  LE: no edema.   Musculoskeltal: extremities normal, no peripheral edema  Skin: no suspicious lesions or rashes  Neuro: Alert, oriented, speech and mentation normal;  moving all 4 extremities equally.  Psych: affect and mood normal          Data:   All laboratory data reviewed:    UA RESULTS:  Recent Labs   Lab Test 08/12/19  0850 08/06/19  1132   COLOR Yellow Brown   APPEARANCE Clear Cloudy   URINEGLC Negative Negative   URINEBILI Negative Negative   URINEKETONE Negative Negative   SG 1.015 1.020   UBLD Negative Large*   URINEPH 7.0 7.5*   PROTEIN Negative >=300*   UROBILINOGEN 0.2 0.2   NITRITE Negative Positive*   LEUKEST Trace* Moderate*   RBCU  --  >100*   WBCU  --  25-50*      Component PSA   Latest Ref Rng & Units 0 - 4 ug/L   10/4/2016 8.77 (H)   8/6/2019 14.40 (H)     Lab Results   Component Value Date    CR 0.74 08/06/2019             Impression and Plan:   Impression:   79 year old man with long history of LUTS and recent gross hematuria also with Elevated PSA.      Plan:   Gross hematuria  -We discussed that gross hematuria does warrant a hematuria work-up.  We discussed that this will include a urine cytology, CT urogram, and return for cystoscopy.  -We discussed that there are several " possible etiologies for hematuria, however the concern would be a  malignancy which needs to be ruled out.  -Urine cytology today  -CT urogram ordered  -Return for cystoscopy following CT urogram.    Prostamegaly with LUTS  -He has a long history of LUTS and has been on tamsulosin for at least a decade  -It is possible that his hematuria is related to an enlarged prostate, especially given a partial retention episode  -I will evaluate his prostatic urethra at the time of cystoscopy and discuss possible bladder outlet procedures    Elevated PSA  -His PSA from 2016 was 8.77 and a recheck on August 6 was 14.4.  We discussed that the recent PSA may be falsely elevated due to his partial urinary retention episode.  We also discussed the guidelines recommend stopping screening PSA at age 75 and that there is some difficulty in interpreting PSA in octogenarians.     Thank you for the kind consultation.    Time spent: 30 minutes of which >50% was spent counseling.    Donavan Ruiz MD   Urology  [facility]  [facility] Phone: [phone]  [facility] Phone: [phone]

## 2019-08-12 NOTE — LETTER
2019       RE: Nilo Nelson  [address]  [facility] [address]     Dear Colleague,    Thank you for referring your patient, Nilo Nelson, to the [facility] UROLOG[facility] [place] at [facility]. Please see a copy of my visit note below.    Urology Consult History and Physical  [facility]  Name: Nilo Nelson    MRN: [id number]   YOB: 1940       We were asked to see Nilo Nelson at the request of Dr. Nuñez for evaluation and treatment of gross hematuria.        Chief Complaint:   Gross hematuria and LUTS    History is obtained from the patient            History of Present Illness:   Nilo Nelson is a 79 year old male who is being seen for evaluation of gross hematuria.  Overnight from  to  he developed acute worsening of his difficulty urinating was unable to urinate for several hours.  He then urinated in the morning and noted that it was dark with quite a bit of blood.  He does have a long history of LUTS with frequency, urgency, straining, intermittent stream.  He has been on tamsulosin for at least 10 years.  No other prior episodes of acute urinary retention, UTI, hematuria.  He was seen by his primary care provider on  where a urinalysis was concerning for infection however a urine culture was negative.  He was started on a course of ciprofloxacin.    AUASS: 6-8-6-5-4-3-3 = 29  QOL = 4    UCx 19: Negative    Father diagnosed with prostate cancer in early 80s -  from this at 86.     He notes that he will be moving to [place] in 1 month.    (4 or >)  Location: Urine  Quality: significant LUTS and gross hematuria   Severity: gross hematuria   Duration: 19           Past Medical History:     Past Medical History:   Diagnosis Date     Hernia of unspecified site of abdominal cavity without mention of obstruction or gangrene      Hernia, abdominal      Hyperlipidemia       Hypothyroid      Prostate infection      Prostatism             Past Surgical History:     Past Surgical History:   Procedure Laterality Date     BACK SURGERY      LUMBAR DISC     COLONOSCOPY  10/18/2012    Procedure: COLONOSCOPY;  COLONOSCOPY ;  Surgeon: Zain Lockhart MD;  Location:  GI     LAPAROSCOPIC HERNIORRHAPHY INGUINAL BILATERAL  10/18/2011    Procedure:LAPAROSCOPIC HERNIORRHAPHY INGUINAL BILATERAL; Laparoscopic Bilateral Inguinal Hernia Repair with Mesh; Surgeon:MITESH GREENFIELD; Location:[facility]            Social History:     Social History     Tobacco Use     Smoking status: Former Smoker     Smokeless tobacco: Never Used     Tobacco comment: per encounter 3/9/07   Substance Use Topics     Alcohol use: Yes     Alcohol/week: 0.0 oz     Comment: 1 day       History   Smoking Status     Former Smoker   Smokeless Tobacco     Never Used     Comment: per encounter 3/9/07            Family History:   No family history on file.           Allergies:   No Known Allergies         Medications:     Current Outpatient Medications   Medication Sig     aspirin 81 MG tablet Take 1 tablet by mouth daily.     ciprofloxacin (CIPRO) 500 MG tablet Take 1 tablet (500 mg) by mouth 2 times daily     GLUCOSAMINE CHONDROITIN COMPLX PO Take 1 tablet by mouth daily.     levothyroxine (SYNTHROID/LEVOTHROID) 75 MCG tablet TAKE 1 TABLET(75 MCG) BY MOUTH DAILY     Loratadine (CLARITIN PO) Take  by mouth as needed.     Multiple Vitamins-Minerals (CENTRUM SILVER) per tablet Take 1 tablet by mouth daily.     Probiotic Product (FLORAJEN3) CAPS 1 CAPSULE ONCE DAILY     Psyllium (METAMUCIL PO) Take 2 tsp by mouth daily.     simvastatin (ZOCOR) 10 MG tablet TAKE 1 TABLET(10 MG) BY MOUTH AT BEDTIME     tamsulosin (FLOMAX) 0.4 MG capsule TAKE 1 CAPSULE(0.4 MG) BY MOUTH TWICE DAILY     VITAMIN D, CHOLECALCIFEROL, PO Take 600 tablets by mouth daily     No current facility-administered medications for this visit.              Review of Systems:  "    Skin: as above  Eyes: glasses  Ears/Nose/Throat: negative  Respiratory: No shortness of breath, dyspnea on exertion, cough, or hemoptysis  Cardiovascular: negative  Gastrointestinal: negative  Genitourinary: as above  Musculoskeletal: negative  Neurologic: negative  Psychiatric: negative  Hematologic/Lymphatic/Immunologic: negative  Endocrine: negative          Physical Exam:     Patient Vitals for the past 24 hrs:   BP Height Weight   08/12/19 0853 128/68 1.702 m (5' 7\") 72.6 kg (160 lb)     Body mass index is 25.06 kg/m .     General: age-appropriate appearing male in NAD  HEENT: Head AT/NC, EOMI, CN Grossly intact  Lungs: no respiratory distress, or pursed lip breathing  Heart: No obvious jugular venous distension present  Back: no bony midline tenderness, no CVAT bilaterally.  Abdomen: soft, non-distended, non-tender. No organomegaly  : normal male external genitalia.   Rectal: ~50cc gland, smooth and symmetric firmness, no discrete nodules   Lymph: no palpable inguinal lymphadenopathy.  LE: no edema.   Musculoskeltal: extremities normal, no peripheral edema  Skin: no suspicious lesions or rashes  Neuro: Alert, oriented, speech and mentation normal;  moving all 4 extremities equally.  Psych: affect and mood normal          Data:   All laboratory data reviewed:    UA RESULTS:  Recent Labs   Lab Test 08/12/19  0850 08/06/19  1132   COLOR Yellow Brown   APPEARANCE Clear Cloudy   URINEGLC Negative Negative   URINEBILI Negative Negative   URINEKETONE Negative Negative   SG 1.015 1.020   UBLD Negative Large*   URINEPH 7.0 7.5*   PROTEIN Negative >=300*   UROBILINOGEN 0.2 0.2   NITRITE Negative Positive*   LEUKEST Trace* Moderate*   RBCU  --  >100*   WBCU  --  25-50*      Component PSA   Latest Ref Rng & Units 0 - 4 ug/L   10/4/2016 8.77 (H)   8/6/2019 14.40 (H)     Lab Results   Component Value Date    CR 0.74 08/06/2019             Impression and Plan:   Impression:   79 year old man with long history of LUTS " and recent gross hematuria also with Elevated PSA.      Plan:   Gross hematuria  -We discussed that gross hematuria does warrant a hematuria work-up.  We discussed that this will include a urine cytology, CT urogram, and return for cystoscopy.  -We discussed that there are several possible etiologies for hematuria, however the concern would be a  malignancy which needs to be ruled out.  -Urine cytology today  -CT urogram ordered  -Return for cystoscopy following CT urogram.    Prostamegaly with LUTS  -He has a long history of LUTS and has been on tamsulosin for at least a decade  -It is possible that his hematuria is related to an enlarged prostate, especially given a partial retention episode  -I will evaluate his prostatic urethra at the time of cystoscopy and discuss possible bladder outlet procedures    Elevated PSA  -His PSA from 2016 was 8.77 and a recheck on August 6 was 14.4.  We discussed that the recent PSA may be falsely elevated due to his partial urinary retention episode.  We also discussed the guidelines recommend stopping screening PSA at age 75 and that there is some difficulty in interpreting PSA in octogenarians.     Thank you for the kind consultation.    Time spent: 30 minutes of which >50% was spent counseling.    Donavan Ruiz MD   Urology  [facility]  [facility] Phone: [phone]  [facility] Phone: [phone]         Again, thank you for allowing me to participate in the care of your patient.      Sincerely,    Donavan Ruiz MD

## 2019-08-12 NOTE — NURSING NOTE
"Chief Complaint   Patient presents with     Hematuria     Pt is here for gross hematuria     Urinary Frequency     Urgency of urination       Patient Active Problem List   Diagnosis     Benign essential hypertension     Hyperlipidemia LDL goal <130     Prostatism     Hypothyroidism due to acquired atrophy of thyroid     Advanced directives, counseling/discussion     Actinic keratosis       No Known Allergies    /68   Ht 1.702 m (5' 7\")   Wt 72.6 kg (160 lb)   BMI 25.06 kg/m      PVR: 305 ml was found in the pt bladder by ultrasound.    Patient was informed, and cleaned after the Ultrasound        Jaylen Ames EMT-B  8/12/2019         "

## 2019-08-12 NOTE — LETTER
[facility]  [address] [facility]  [address]  [place]  [address]  Tel: [phone]    August 13, 2019    Nilo Nelson  [address]   [facility] [address]        Dear Mr. Nelson,    This is to inform you regarding your test result.    I am covering doctor for Dr. Nuñez.  C-reactive protein which is test to check for inflammation is slightly elevated.  There is improvement in number  PSA test for prostate cancer screening is elevated and you have seen urologist today for that  Basic metabolic panel which includes electrolytes and blood sugar is satisfactory.  CBC result which includes Hemoglobin and  Platelet Counts is satisfactory.  Urine test result was satisfactory.  Follow-up with your primary care doctor    If you have any further questions or problems, please contact our office.      Sincerely,    Анна Helm/ Silva Duffy, CMA  Results for orders placed or performed in visit on 08/12/19   Basic metabolic panel  (Ca, Cl, CO2, Creat, Gluc, K, Na, BUN)   Result Value Ref Range    Sodium 134 133 - 144 mmol/L    Potassium 4.2 3.4 - 5.3 mmol/L    Chloride 103 94 - 109 mmol/L    Carbon Dioxide 27 20 - 32 mmol/L    Anion Gap 4 3 - 14 mmol/L    Glucose 78 70 - 99 mg/dL    Urea Nitrogen 8 7 - 30 mg/dL    Creatinine 0.84 0.66 - 1.25 mg/dL    GFR Estimate 83 >60 mL/min/[1.73_m2]    GFR Estimate If Black >90 >60 mL/min/[1.73_m2]    Calcium 8.7 8.5 - 10.1 mg/dL   PSA, tumor marker   Result Value Ref Range    PSA 12.80 (H) 0 - 4 ug/L   CRP, inflammation   Result Value Ref Range    CRP Inflammation 9.6 (H) 0.0 - 8.0 mg/L   UA with Microscopic reflex to Culture   Result Value Ref Range    Color Urine Yellow     Appearance Urine Clear     Glucose Urine Negative NEG^Negative mg/dL    Bilirubin Urine Negative NEG^Negative    Ketones Urine Negative NEG^Negative mg/dL    Specific Gravity Urine 1.015 1.003 - 1.035    pH Urine 7.0 5.0 - 7.0 pH    Protein Albumin Urine Negative NEG^Negative mg/dL     Urobilinogen Urine 0.2 0.2 - 1.0 EU/dL    Nitrite Urine Negative NEG^Negative    Blood Urine Negative NEG^Negative    Leukocyte Esterase Urine Negative NEG^Negative    Source Midstream Urine     WBC Urine 0 - 5 OTO5^0 - 5 /HPF    RBC Urine O - 2 OTO2^O - 2 /HPF    Bacteria Urine Few (A) NEG^Negative /HPF   CBC with platelets and differential   Result Value Ref Range    WBC 6.4 4.0 - 11.0 10e9/L    RBC Count 4.53 4.4 - 5.9 10e12/L    Hemoglobin 14.8 13.3 - 17.7 g/dL    Hematocrit 43.4 40.0 - 53.0 %    MCV 96 78 - 100 fl    MCH 32.7 26.5 - 33.0 pg    MCHC 34.1 31.5 - 36.5 g/dL    RDW 12.3 10.0 - 15.0 %    Platelet Count 311 150 - 450 10e9/L    % Neutrophils 67.6 %    % Lymphocytes 15.8 %    % Monocytes 13.7 %    % Eosinophils 2.3 %    % Basophils 0.6 %    Absolute Neutrophil 4.4 1.6 - 8.3 10e9/L    Absolute Lymphocytes 1.0 0.8 - 5.3 10e9/L    Absolute Monocytes 0.9 0.0 - 1.3 10e9/L    Absolute Eosinophils 0.2 0.0 - 0.7 10e9/L    Absolute Basophils 0.0 0.0 - 0.2 10e9/L    Diff Method Automated Method                Enclosure: Lab Results

## 2019-08-14 ENCOUNTER — HOSPITAL ENCOUNTER (OUTPATIENT)
Dept: CT IMAGING | Facility: CLINIC | Age: 79
Discharge: HOME | End: 2019-08-14
Attending: UROLOGY | Admitting: UROLOGY
Payer: MEDICARE

## 2019-08-14 DIAGNOSIS — R31.0 GROSS HEMATURIA: ICD-10-CM

## 2019-08-14 PROCEDURE — 25000128 H RX IP 250 OP 636: Performed by: UROLOGY

## 2019-08-14 PROCEDURE — 74178 CT ABD&PLV WO CNTR FLWD CNTR: CPT

## 2019-08-14 PROCEDURE — 25000125 ZZHC RX 250: Performed by: UROLOGY

## 2019-08-14 RX ORDER — IOPAMIDOL 755 MG/ML
100 INJECTION, SOLUTION INTRAVASCULAR ONCE
Status: COMPLETED | OUTPATIENT
Start: 2019-08-14 | End: 2019-08-14

## 2019-08-14 RX ADMIN — IOPAMIDOL 100 ML: 755 INJECTION, SOLUTION INTRAVENOUS at 16:11

## 2019-08-14 RX ADMIN — SODIUM CHLORIDE 60 ML: 9 INJECTION, SOLUTION INTRAVENOUS at 16:11

## 2019-08-16 PROBLEM — R31.9 URINARY TRACT INFECTION WITH HEMATURIA, SITE UNSPECIFIED: Status: ACTIVE | Noted: 2019-08-16

## 2019-08-16 PROBLEM — N39.0 URINARY TRACT INFECTION WITH HEMATURIA, SITE UNSPECIFIED: Status: ACTIVE | Noted: 2019-08-16

## 2019-08-16 PROBLEM — D72.829 LEUKOCYTOSIS, UNSPECIFIED TYPE: Status: ACTIVE | Noted: 2019-08-16

## 2019-08-16 PROBLEM — R31.0 GROSS HEMATURIA: Status: ACTIVE | Noted: 2019-08-16

## 2019-08-20 LAB
COPATH REPORT: NORMAL
COPATH REPORT: NORMAL

## 2019-08-22 DIAGNOSIS — R31.9 HEMATURIA: Primary | ICD-10-CM

## 2019-08-27 DIAGNOSIS — R31.9 HEMATURIA: Primary | ICD-10-CM

## 2019-08-28 ENCOUNTER — OFFICE VISIT (OUTPATIENT)
Dept: UROLOGY | Facility: CLINIC | Age: 79
End: 2019-08-28
Payer: MEDICARE

## 2019-08-28 VITALS
DIASTOLIC BLOOD PRESSURE: 78 MMHG | BODY MASS INDEX: 25.58 KG/M2 | WEIGHT: 163 LBS | HEIGHT: 67 IN | HEART RATE: 69 BPM | SYSTOLIC BLOOD PRESSURE: 130 MMHG | OXYGEN SATURATION: 97 %

## 2019-08-28 DIAGNOSIS — N40.0 BPH WITHOUT OBSTRUCTION/LOWER URINARY TRACT SYMPTOMS: Primary | ICD-10-CM

## 2019-08-28 DIAGNOSIS — R31.0 GROSS HEMATURIA: ICD-10-CM

## 2019-08-28 LAB
ALBUMIN UR-MCNC: NEGATIVE MG/DL
APPEARANCE UR: CLEAR
BILIRUB UR QL STRIP: NEGATIVE
COLOR UR AUTO: YELLOW
GLUCOSE UR STRIP-MCNC: NEGATIVE MG/DL
HGB UR QL STRIP: NEGATIVE
KETONES UR STRIP-MCNC: NEGATIVE MG/DL
LEUKOCYTE ESTERASE UR QL STRIP: NEGATIVE
NITRATE UR QL: NEGATIVE
PH UR STRIP: 6.5 PH (ref 5–7)
SOURCE: NORMAL
SP GR UR STRIP: 1.01 (ref 1–1.03)
UROBILINOGEN UR STRIP-ACNC: 0.2 EU/DL (ref 0.2–1)

## 2019-08-28 PROCEDURE — 52000 CYSTOURETHROSCOPY: CPT | Performed by: UROLOGY

## 2019-08-28 PROCEDURE — 81003 URINALYSIS AUTO W/O SCOPE: CPT | Performed by: UROLOGY

## 2019-08-28 RX ORDER — FINASTERIDE 5 MG/1
5 TABLET, FILM COATED ORAL DAILY
Qty: 90 TABLET | Refills: 3 | Status: SHIPPED | OUTPATIENT
Start: 2019-08-28

## 2019-08-28 ASSESSMENT — PAIN SCALES - GENERAL: PAINLEVEL: NO PAIN (0)

## 2019-08-28 ASSESSMENT — MIFFLIN-ST. JEOR: SCORE: 1407.99

## 2019-08-28 NOTE — Clinical Note
Hi Dr. Carr,I saw Nilo back today for his cystoscopy as part of his hematuria work-up.  This was negative for malignancy however did demonstrate significant BPH, bladder trabeculation, and bladder diverticula.  He would be a good candidate for a bladder outlet procedure, however given his moved to [place] in the next few weeks he would like to have this done following his move.  I advised that he establish with a urologist in [place] shortly following his move.  In the interim we discussed the addition of finasteride to help decrease the size of the prostate and help decrease leading from the prostate.Thanks, Donavan Ruiz M.D. Cell: [phone]

## 2019-08-28 NOTE — PROGRESS NOTES
"CYSTOSCOPY PROCEDURE NOTE:    Nilo Nelson is a 79 year old male  who presents with gross hematuria for cystoscopy .    Seen on 8/12/19:  \"Nilo Nelson is a 79 year old male who is being seen for evaluation of gross hematuria.  Overnight from August 4 to August 5 he developed acute worsening of his difficulty urinating was unable to urinate for several hours.  He then urinated in the morning and noted that it was dark with quite a bit of blood.  He does have a long history of LUTS with frequency, urgency, straining, intermittent stream.  He has been on tamsulosin for at least 10 years.  No other prior episodes of acute urinary retention, UTI, hematuria.  He was seen by his primary care provider on August 6 where a urinalysis was concerning for infection however a urine culture was negative.  He was started on a course of ciprofloxacin.     AUASS: 1-8-8-5-4-3-3 = 29  QOL = 4     UCx 8/6/19: Negative\"    Pt ID verified with patient: Yes     Procedure verified with patient: Yes     Procedure confirmed with physician and support staff: Yes     Consent form confirmed with physician and support staff.    Sign In  History and Physical Exam reviewed and Yes .  Informed Consent Discussed: Yes   Sign in Communication: Yes   Time Out:  Team Confirms the Correct Patient, Correct Procedure; Yes , Correct Site and Site Marking, Correct Position (if applicable).    Affirmation of Time Out: Yes   Sign Out:  Sign Out Discussion: Yes   Physician: Donavan Ruiz MD    A urinalysis was performed revealing no evidence of infection.    The benefits, risks, alternatives of the cystoscopy procedure and personnel were discussed with the patient. The verbal consent was obtained and the patient agrees to proceed.      Description of procedure:   After fully informed, voluntary consent was obtained, the patient was brought into the procedure room, identified and placed in a supine position on the cystoscopy table.  The groin/scrotum " were prepped with betadine and draped in a sterile fashion.  Urojet lidocaine gel was introduced.  A 15F flexible cystoscope was inserted into the urethra, and the bladder and urethra wereexamined in a systematic manner.  The patient tolerated the procedure well and there were no complications.      Cystoscopic findings:  The urethra was normal without strictures.  The prostate was 5-6cm long and demonstrated significant bilobar hypertrophy.  There was no median lobe, however the entire prostate pushed up into the base of the bladder.  The external sphincter coapted normally and the bladder neck was high. There was some bleeding noted from the prostatic urethra. The bladder was fairly full and ~500cc was drained. The bladder was  entered and careful pan endoscopy was carried out. The posterior, superior and lateral walls and dome of the bladder were all well visualized and the scope was retroflexed upon itself. There was severe trabeculation.  There were no neoplasms, stones identifed. There were numerous small diverticula. The ureteric orifices were normal in position and number and effluxing clear urine.    CT UROGRAM:  IMPRESSION:   1. No evidence for renal, ureteral, or bladder calculi.  2. No masses or suspicious filling defects within the opacified  urinary collecting system.  3. Prostatomegaly, bladder distention diverticula and bilateral  hydronephrosis concerning for bladder outlet obstruction.    Assessment/Plan:   Nilo Nelson is a 79 year old male with a history of gross hematuria and BPH with LUTS.    - Cystoscopy today with evidence of significant bilobar prostatic hypertrophy and a bleeding noted from the prostate   - No evidence of intra-vesicle masses with significant trabeculation and numerous small diverticula  - We discussed that his hematuria work up was negative for malignancy  - Given his significant prostatic hypertrophy he would be a candidate for a bladder outlet procedure  - He is  moving to relocate permanently in [place] and We discussed that he should establish with a urologist in the area following his move for further treatment  - I advised that he continue tamsulosin 0.4mg (Flomax) which he is taking BID  - We discussed the addition of finasteride 5mg (Proscar)       Donavan Ruiz MD

## 2019-08-28 NOTE — PATIENT INSTRUCTIONS
"AFTER YOUR CYSTOSCOPY  ?  ?  You have just completed a cystoscopy, or \"cysto\", which allowed your physician to learn more about your bladder (or to remove a stent placed after surgery). We suggest that you continue to avoid caffeine, fruit juice, and alcohol for the next 24 hours, however, you are encouraged to return to your normal activities.  ?  ?  A few things that are considered normal after your cystoscopy:  ?  * small amount of bleeding (or spotting) that clears within the next 24 hours  ?  * slight burning sensation with urination  ?  * sensation of needing to void (urinate) more frequently  ?  * the feeling of \"air\" in your urine  ?  * mild discomfort that is relieved with Tylenol    * bladder spasms  ?  ?  ?  Please contact our office promptly if you:  ?  * develop a fever above 101 degrees  ?  * are unable to urinate  ?  * develop bright red blood that does not stop  ?  * experience severe pain or swelling  ?  ?  ?  And of course, please contact our office with any concerns or questions [phone]  ?    AFTER YOUR CYSTOSCOPY        You have just completed a cystoscopy, or \"cysto\", which allowed your physician to learn more about your bladder (or to remove a stent placed after surgery). We suggest that you continue to avoid caffeine, fruit juice, and alcohol for the next 24 hours, however, you are encouraged to return to your normal activities.         A few things that are considered normal after your cystoscopy:     * Small amount of bleeding (or spotting) that clears within the next 24 hours     * Slight burning sensation with urination     * Sensation to of needing to avoid more frequently     * The feeling of \"air\" in your urine     * Mild discomfort that is relieved with Tylenol        Please contact our office promptly if you:     * Develop a fever above 101 degrees     * Are unable to urinate     * Develop bright red blood that does not stop     * Severe pain or swelling         Please contact " our office with any concerns or questions @[facility].

## 2019-08-28 NOTE — LETTER
"8/28/2019       RE: Nilo Nelson  [address]  [facility] [address]     Dear Colleague,    Thank you for referring your patient, Nilo Nelson, to the [facility] UROLOGY CLINIC [place] at [facility]. Please see a copy of my visit note below.    CYSTOSCOPY PROCEDURE NOTE:    Nilo Nelson is a 79 year old male  who presents with gross hematuria for cystoscopy .    Seen on 8/12/19:  \"Nilo Nelson is a 79 year old male who is being seen for evaluation of gross hematuria.  Overnight from August 4 to August 5 he developed acute worsening of his difficulty urinating was unable to urinate for several hours.  He then urinated in the morning and noted that it was dark with quite a bit of blood.  He does have a long history of LUTS with frequency, urgency, straining, intermittent stream.  He has been on tamsulosin for at least 10 years.  No other prior episodes of acute urinary retention, UTI, hematuria.  He was seen by his primary care provider on August 6 where a urinalysis was concerning for infection however a urine culture was negative.  He was started on a course of ciprofloxacin.     AUASS: 1-9-4-5-4-3-3 = 29  QOL = 4     UCx 8/6/19: Negative\"    Pt ID verified with patient: Yes     Procedure verified with patient: Yes     Procedure confirmed with physician and support staff: Yes     Consent form confirmed with physician and support staff.    Sign In  History and Physical Exam reviewed and Yes .  Informed Consent Discussed: Yes   Sign in Communication: Yes   Time Out:  Team Confirms the Correct Patient, Correct Procedure; Yes , Correct Site and Site Marking, Correct Position (if applicable).    Affirmation of Time Out: Yes   Sign Out:  Sign Out Discussion: Yes   Physician: Donavan Ruiz MD    A urinalysis was performed revealing no evidence of infection.    The benefits, risks, alternatives of the cystoscopy procedure and personnel were " discussed with the patient. The verbal consent was obtained and the patient agrees to proceed.      Description of procedure:   After fully informed, voluntary consent was obtained, the patient was brought into the procedure room, identified and placed in a supine position on the cystoscopy table.  The groin/scrotum were prepped with betadine and draped in a sterile fashion.  Urojet lidocaine gel was introduced.  A 15F flexible cystoscope was inserted into the urethra, and the bladder and urethra wereexamined in a systematic manner.  The patient tolerated the procedure well and there were no complications.      Cystoscopic findings:  The urethra was normal without strictures.  The prostate was 5-6cm long and demonstrated significant bilobar hypertrophy.  There was no median lobe, however the entire prostate pushed up into the base of the bladder.  The external sphincter coapted normally and the bladder neck was high. There was some bleeding noted from the prostatic urethra. The bladder was fairly full and ~500cc was drained. The bladder was  entered and careful pan endoscopy was carried out. The posterior, superior and lateral walls and dome of the bladder were all well visualized and the scope was retroflexed upon itself. There was severe trabeculation.  There were no neoplasms, stones identifed. There were numerous small diverticula. The ureteric orifices were normal in position and number and effluxing clear urine.    CT UROGRAM:  IMPRESSION:   1. No evidence for renal, ureteral, or bladder calculi.  2. No masses or suspicious filling defects within the opacified  urinary collecting system.  3. Prostatomegaly, bladder distention diverticula and bilateral  hydronephrosis concerning for bladder outlet obstruction.    Assessment/Plan:   Nilo Nelson is a 79 year old male with a history of gross hematuria and BPH with LUTS.    - Cystoscopy today with evidence of significant bilobar prostatic hypertrophy and a  bleeding noted from the prostate   - No evidence of intra-vesicle masses with significant trabeculation and numerous small diverticula  - We discussed that his hematuria work up was negative for malignancy  - Given his significant prostatic hypertrophy he would be a candidate for a bladder outlet procedure  - He is moving to relocate permanently in [place] and We discussed that he should establish with a urologist in the area following his move for further treatment  - I advised that he continue tamsulosin 0.4mg (Flomax) which he is taking BID  - We discussed the addition of finasteride 5mg (Proscar)       Donavan Ruiz MD

## 2019-08-28 NOTE — NURSING NOTE
Chief Complaint   Patient presents with     Cystoscopy     Pt here for cysto due to hematuria     Prior to the start of the procedure and with procedural staff participation, I verbally confirmed the patient s identity using two indicators, relevant allergies, that the procedure was appropriate and matched the consent or emergent situation, and that the correct equipment/implants were available. Immediately prior to starting the procedure I conducted the Time Out with the procedural staff and re-confirmed the patient s name, procedure, and site/side. I have wiped the patient off with the povidone-Iodine solution, draped them,  used Lidocaine hydrochloride jelly, and instilled sterile water into the bladder. (The Joint Commission universal protocol was followed.)  Yes    Sedation (Moderate or Deep): None  UA RESULTS:  Recent Labs   Lab Test 08/28/19  0808 08/12/19  0945   COLOR Yellow Yellow   APPEARANCE Clear Clear   URINEGLC Negative Negative   URINEBILI Negative Negative   URINEKETONE Negative Negative   SG 1.015 1.015   UBLD Negative Negative   URINEPH 6.5 7.0   PROTEIN Negative Negative   UROBILINOGEN 0.2 0.2   NITRITE Negative Negative   LEUKEST Negative Negative   RBCU  --  O - 2   WBCU  --  0 - 5       Trena Zhao CMA

## 2019-09-14 DIAGNOSIS — N40.0 PROSTATISM: ICD-10-CM

## 2019-09-14 DIAGNOSIS — E03.4 HYPOTHYROIDISM DUE TO ACQUIRED ATROPHY OF THYROID: ICD-10-CM

## 2019-09-16 DIAGNOSIS — N40.0 PROSTATISM: ICD-10-CM

## 2019-09-16 RX ORDER — LEVOTHYROXINE SODIUM 75 UG/1
TABLET ORAL
Qty: 30 TABLET | Refills: 0 | Status: SHIPPED | OUTPATIENT
Start: 2019-09-16

## 2019-09-16 RX ORDER — TAMSULOSIN HYDROCHLORIDE 0.4 MG/1
CAPSULE ORAL
Qty: 30 CAPSULE | Refills: 0 | Status: SHIPPED | OUTPATIENT
Start: 2019-09-16

## 2019-09-16 NOTE — TELEPHONE ENCOUNTER
Spoke with pt/wife:     Medications were packed and are in transport to [place]. Requesting #[phone] to get them through to the arrival of the medications.     Tiffany HAWTHORNE RN

## 2019-09-16 NOTE — TELEPHONE ENCOUNTER
"tamsulosin (FLOMAX) 0.4 MG capsule 180 capsule 0 6/10/2019  No   Sig: TAKE 1 CAPSULE(0.4 MG) BY MOUTH TWICE DAILY     Last Written Prescription Date:  06/10/2019  Last Fill Quantity: 180,  # refills: 0   Last office visit: 8/6/2019 with prescribing provider:     Future Office Visit:         levothyroxine (SYNTHROID/LEVOTHROID) 75 MCG tablet 90 tablet 3 11/6/2018  No   Sig: TAKE 1 TABLET(75 MCG) BY MOUTH DAILY     [facility] in [place] should have refills on file until Nov 2019 on levothyroxine    Requested Prescriptions   Pending Prescriptions Disp Refills     tamsulosin (FLOMAX) 0.4 MG capsule [Pharmacy Med Name: TAMSULOSIN 0.4MG CAPSULES] 180 capsule 0     Sig: TAKE 1 CAPSULE(0.4 MG) BY MOUTH TWICE DAILY       Alpha Blockers Passed - 9/14/2019  2:44 PM        Passed - Blood pressure under 140/90 in past 12 months     BP Readings from Last 3 Encounters:   08/28/19 130/78   08/12/19 128/68   08/06/19 112/71                 Passed - Recent (12 mo) or future (30 days) visit within the authorizing provider's specialty     Patient had office visit in the last 12 months or has a visit in the next 30 days with authorizing provider or within the authorizing provider's specialty.  See \"Patient Info\" tab in inbasket, or \"Choose Columns\" in Meds & Orders section of the refill encounter.              Passed - Patient does not have Tadalafil, Vardenafil, or Sildenafil on their medication list        Passed - Medication is active on med list        Passed - Patient is 18 years of age or older        levothyroxine (SYNTHROID/LEVOTHROID) 75 MCG tablet [Pharmacy Med Name: LEVOTHYROXINE 0.075MG (75MCG) TABS] 90 tablet 0     Sig: TAKE 1 TABLET(75 MCG) BY MOUTH DAILY       Thyroid Protocol Passed - 9/14/2019  2:44 PM        Passed - Patient is 12 years or older        Passed - Recent (12 mo) or future (30 days) visit within the authorizing provider's specialty     Patient had office visit in the last 12 months or has a visit in the next 30 " "days with authorizing provider or within the authorizing provider's specialty.  See \"Patient Info\" tab in inbasket, or \"Choose Columns\" in Meds & Orders section of the refill encounter.              Passed - Medication is active on med list        Passed - Normal TSH on file in past 12 months     Recent Labs   Lab Test 10/09/18  1047   TSH 1.63                "

## 2019-09-18 RX ORDER — TAMSULOSIN HYDROCHLORIDE 0.4 MG/1
CAPSULE ORAL
Start: 2019-09-18

## 2019-09-18 NOTE — TELEPHONE ENCOUNTER
Not duplicate  Pharmacy requesting 90 day instead  Denied  See TE from 9/14/2019  Patient uses mail order - only needs shortterm supply from local pharmacy  Roshni ARGUETA RN

## 2024-06-17 PROBLEM — Z71.89 ADVANCED DIRECTIVES, COUNSELING/DISCUSSION: Status: RESOLVED | Noted: 2017-10-05 | Resolved: 2024-06-17
